# Patient Record
Sex: FEMALE
[De-identification: names, ages, dates, MRNs, and addresses within clinical notes are randomized per-mention and may not be internally consistent; named-entity substitution may affect disease eponyms.]

---

## 2017-01-11 ENCOUNTER — HOSPITAL ENCOUNTER (EMERGENCY)
Dept: HOSPITAL 5 - ED | Age: 57
Discharge: HOME | End: 2017-01-11
Payer: SELF-PAY

## 2017-01-11 VITALS — DIASTOLIC BLOOD PRESSURE: 82 MMHG | SYSTOLIC BLOOD PRESSURE: 116 MMHG
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
## 2017-01-11 DIAGNOSIS — S29.011A: Primary | ICD-10-CM

## 2017-01-11 DIAGNOSIS — E05.90: ICD-10-CM

## 2017-01-11 DIAGNOSIS — Z88.5: ICD-10-CM
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
## 2017-01-11 DIAGNOSIS — E11.9: ICD-10-CM

## 2017-01-11 DIAGNOSIS — I10: ICD-10-CM
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
## 2017-01-11 LAB
ANION GAP SERPL CALC-SCNC: 19 MMOL/L
BASOPHILS NFR BLD AUTO: 0.6 % (ref 0–1.8)
BUN SERPL-MCNC: 12 MG/DL (ref 7–17)
BUN/CREAT SERPL: 13.33 %
CALCIUM SERPL-MCNC: 9.1 MG/DL (ref 8.4–10.2)
CHLORIDE SERPL-SCNC: 94.9 MMOL/L (ref 98–107)
CK SERPL-CCNC: 26 UNITS/L (ref 30–135)
CO2 SERPL-SCNC: 27 MMOL/L (ref 22–30)
EOSINOPHIL NFR BLD AUTO: 1.8 % (ref 0–4.3)
GLUCOSE SERPL-MCNC: 269 MG/DL (ref 65–100)
HCT VFR BLD CALC: 43.5 % (ref 30.3–42.9)
HGB BLD-MCNC: 14.8 GM/DL (ref 10.1–14.3)
MCH RBC QN AUTO: 29 PG (ref 28–32)
MCHC RBC AUTO-ENTMCNC: 34 % (ref 30–34)
MCV RBC AUTO: 84 FL (ref 79–97)
PLATELET # BLD: 265 K/MM3 (ref 140–440)
POTASSIUM SERPL-SCNC: 4 MMOL/L (ref 3.6–5)
RBC # BLD AUTO: 5.17 M/MM3 (ref 3.65–5.03)
SODIUM SERPL-SCNC: 137 MMOL/L (ref 137–145)
WBC # BLD AUTO: 10.8 K/MM3 (ref 4.5–11)

## 2017-01-11 PROCEDURE — 93005 ELECTROCARDIOGRAM TRACING: CPT

## 2017-01-11 PROCEDURE — 71020: CPT

## 2017-01-11 PROCEDURE — 82553 CREATINE MB FRACTION: CPT

## 2017-01-11 PROCEDURE — 82550 ASSAY OF CK (CPK): CPT

## 2017-01-11 PROCEDURE — 85025 COMPLETE CBC W/AUTO DIFF WBC: CPT

## 2017-01-11 PROCEDURE — 36415 COLL VENOUS BLD VENIPUNCTURE: CPT

## 2017-01-11 PROCEDURE — 80048 BASIC METABOLIC PNL TOTAL CA: CPT

## 2017-01-11 PROCEDURE — 84484 ASSAY OF TROPONIN QUANT: CPT

## 2017-01-11 PROCEDURE — 71110 X-RAY EXAM RIBS BIL 3 VIEWS: CPT

## 2017-01-11 PROCEDURE — 93010 ELECTROCARDIOGRAM REPORT: CPT

## 2017-01-11 NOTE — XRAY REPORT
RIB RADIOGRAPHS WITH CHEST VIEW:



INDICATION: Patient fell on chest 5 days ago.  Chest, right upper pain. 

 Pain on breathing and raising arms.  Smoker, history of asthma.



COMPARISON: 07/09/2016 CXR.



FINDINGS: Frontal chest as also AP and oblique radiographs to evaluate 

bilateral ribs, 6 projections demonstrate normal cardiomediastinal 

silhouette. Clear lungs without effusions, CHF or pneumothorax.  Slight 

aortic knob calcifications.  Lumbar spondylosis and fusion hardware 

incompletely imaged.



Specifically, no definite or significantly displaced rib fractures 

identified.



CONCLUSION: Normal bilateral rib radiographs with few other incidental 

findings, as described. Please note that some acute rib fractures may 

be radiographically occult.



Thank you for the opportunity to participate in this patient's care.

## 2017-01-11 NOTE — XRAY REPORT
CHEST 2 VIEWS:



INDICATION: Chest pain, shortness of breath.



COMPARISON: 07/09/2016



FINDINGS: PA and lateral chest radiographs again demonstrate normal 

cardiomediastinal silhouette, slight aortic knob calcifications and 

clear lungs.  Grossly unremarkable bones. 



CONCLUSION: No acute disease in the chest.



Thank you for the opportunity to participate in this patient's care.

## 2017-01-11 NOTE — EMERGENCY DEPARTMENT REPORT
Addendum entered and electronically signed by RASTA ROQUE NP  01/11/ 17 20:54: 











Original Note:








ED Fall HPI





- General


Chief Complaint: Chest Pain


Stated Complaint: CHEST PAIN


Source: patient


Mode of arrival: Ambulatory





- History of Present Illness


Initial Comments: 





Patient presents after a fall 4 days ago landing on her chest.  She states the 

pain is in the right upper portion.  She states she tripped over a garbage can 

and fell on the concrete.  She does admit to trying Aleve with no help and 

hydrocodone 5 mg that did help.  She denies hitting her head, nausea, vomiting, 

chills, fever.


MD Complaint: fall


-: Sudden


Fall From: standing


When Fall Occurred: # days PTA (4)


Fall Witnessed: no


Place Fall Occurred: home


Loss of Consciousness: none


Prolonged Down Time?: no


Symptoms Prior to Fall: none


Location: chest


Severity: severe


Severity scale (0 -10): 10


Quality: sharp, aching


Context: tripped/slipped


Associated Symptoms: denies





- Related Data


 Previous Rx's











 Medication  Instructions  Recorded  Last Taken  Type


 


Miconazole Nitrate [Miconazole 7 1 suppositor VG QHS #7 supp.vag 10/26/15 

Unknown Rx





Vag Suppos]    


 


Ciprofloxacin HCl [Ciprofloxacin 500 mg PO BID #20 tablet 07/10/16 Unknown Rx





TAB]    


 


Cyclobenzaprine HCl 7.5 mg PO BID PRN #14 tab 01/11/17 Unknown Rx





[Cyclobenzaprine 7.5 MG TAB]    











 Allergies











Allergy/AdvReac Type Severity Reaction Status Date / Time


 


codeine Allergy  Rash Verified 10/25/15 12:04














ED Review of Systems


ROS: 


Stated complaint: CHEST PAIN


Other details as noted in HPI





Constitutional: denies: chills, fever


Respiratory: denies: cough, shortness of breath, wheezing


Cardiovascular: denies: chest pain, palpitations


Gastrointestinal: denies: abdominal pain, nausea, diarrhea


Musculoskeletal: as per HPI


Skin: denies: rash, lesions


Neurological: denies: headache, weakness, paresthesias





ED Past Medical Hx





- Past Medical History


Hx Hypertension: Yes


Hx Diabetes: Yes


Additional medical history: hyperthyroid





- Surgical History


Additional Surgical History: fusion to back





- Social History


Smoking Status: Never Smoker


Substance Use Type: None





- Medications


Home Medications: 


 Home Medications











 Medication  Instructions  Recorded  Confirmed  Last Taken  Type


 


Miconazole Nitrate [Miconazole 7 1 suppositor VG QHS #7 supp.vag 10/26/15  

Unknown Rx





Vag Suppos]     


 


Ciprofloxacin HCl [Ciprofloxacin 500 mg PO BID #20 tablet 07/10/16  Unknown Rx





TAB]     


 


Cyclobenzaprine HCl 7.5 mg PO BID PRN #14 tab 01/11/17  Unknown Rx





[Cyclobenzaprine 7.5 MG TAB]     














ED Physical Exam





- General


Limitations: No Limitations


General appearance: alert, in no apparent distress





- Head


Head exam: Present: atraumatic, normocephalic





- Neck


Neck exam: Present: normal inspection, full ROM





- Respiratory


Respiratory exam: Present: normal lung sounds bilaterally.  Absent: respiratory 

distress





- Cardiovascular


Cardiovascular Exam: Present: regular rate, normal rhythm.  Absent: systolic 

murmur, diastolic murmur, rubs, gallop





- GI/Abdominal


GI/Abdominal exam: Present: soft, normal bowel sounds





- Back Exam


Back exam: Present: normal inspection





- Neurological Exam


Neurological exam: Present: alert, oriented X3





- Psychiatric


Psychiatric exam: Present: normal affect, normal mood





- Skin


Skin exam: Present: warm, dry, intact, normal color.  Absent: rash





- Other


Other exam information: 





There is no bruising to her right upper chest, there is no decreased range of 

motion of upper extremities.  She is tender to palpation in the intercostal 

spaces approximately at rib #4-5.  Not appreciate any swelling, or redness.





ED Course


 Vital Signs











  01/11/17





  15:51


 


Temperature 98.6 F


 


Pulse Rate 95 H


 


Respiratory 20





Rate 


 


Blood Pressure 112/79


 


O2 Sat by Pulse 100





Oximetry 














ED Medical Decision Making





- Lab Data


Result diagrams: 


 01/11/17 16:51





 01/11/17 16:51





- Medical Decision Making





Patient presents with a fall, her EKG is within normal her chest x-ray is 

within normal and her rib x-ray is within normal.  She is requesting hydrocodone

, I informed her I could give her tramadol which she declined.  I will give her 

Flexeril.  I'll advise her to use a pillow to splint when taking deep breaths.





- Differential Diagnosis


rib fracture, muscle strain


Critical Care Time: No


Critical care attestation.: 


If time is entered above; I have spent that time in minutes in the direct care 

of this critically ill patient, excluding procedure time.








ED Disposition


Clinical Impression: 


 Pectoralis muscle strain


Disposition: DISCHARGED TO HOME OR SELFCARE


Is pt being admited?: No


Does the pt Need Aspirin: No


Condition: Stable


Instructions:  Muscle Strain (ED), Musculoskeletal Pain (ED)


Prescriptions: 


Cyclobenzaprine HCl [Cyclobenzaprine 7.5 MG TAB] 7.5 mg PO BID PRN #14 tab


 PRN Reason: Pain


Time of Disposition: 20:52

## 2017-01-11 NOTE — EMERGENCY DEPARTMENT REPORT
Stated Complaint: CHEST PAIN





- HPI


History of Present Illness: 


Patient c/o soreness-type chest pain gradually worsening x days.


States slipped and fell, landing on her chest 5 days ago.


States pain causing her to have SOB.








- Exam


Physical Exam: 





Heart: RRR. R Chest wall tender to palpation.





MSE screening note: 


Focused history and physical exam performed.


Due to findings the following was ordered:











ED Disposition for MSE


Condition: Stable

## 2017-08-10 ENCOUNTER — HOSPITAL ENCOUNTER (EMERGENCY)
Dept: HOSPITAL 5 - ED | Age: 57
Discharge: LEFT BEFORE DISCHARGE | End: 2017-08-10
Payer: SELF-PAY

## 2017-08-10 VITALS — SYSTOLIC BLOOD PRESSURE: 155 MMHG | DIASTOLIC BLOOD PRESSURE: 91 MMHG

## 2017-08-10 DIAGNOSIS — M79.89: Primary | ICD-10-CM

## 2017-08-10 DIAGNOSIS — Z53.21: ICD-10-CM

## 2017-08-11 ENCOUNTER — HOSPITAL ENCOUNTER (INPATIENT)
Dept: HOSPITAL 5 - ED | Age: 57
LOS: 5 days | Discharge: HOME | DRG: 871 | End: 2017-08-16
Attending: INTERNAL MEDICINE | Admitting: INTERNAL MEDICINE
Payer: SELF-PAY

## 2017-08-11 DIAGNOSIS — Z79.899: ICD-10-CM

## 2017-08-11 DIAGNOSIS — Z88.5: ICD-10-CM

## 2017-08-11 DIAGNOSIS — I10: ICD-10-CM

## 2017-08-11 DIAGNOSIS — L02.411: ICD-10-CM

## 2017-08-11 DIAGNOSIS — M19.90: ICD-10-CM

## 2017-08-11 DIAGNOSIS — L02.92: ICD-10-CM

## 2017-08-11 DIAGNOSIS — B02.9: ICD-10-CM

## 2017-08-11 DIAGNOSIS — T50.2X5A: ICD-10-CM

## 2017-08-11 DIAGNOSIS — A41.9: Primary | ICD-10-CM

## 2017-08-11 DIAGNOSIS — L03.111: ICD-10-CM

## 2017-08-11 DIAGNOSIS — E87.1: ICD-10-CM

## 2017-08-11 DIAGNOSIS — J45.909: ICD-10-CM

## 2017-08-11 DIAGNOSIS — N17.0: ICD-10-CM

## 2017-08-11 DIAGNOSIS — R65.21: ICD-10-CM

## 2017-08-11 DIAGNOSIS — E13.10: ICD-10-CM

## 2017-08-11 DIAGNOSIS — E86.9: ICD-10-CM

## 2017-08-11 DIAGNOSIS — N39.0: ICD-10-CM

## 2017-08-11 DIAGNOSIS — E78.5: ICD-10-CM
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
## 2017-08-11 DIAGNOSIS — E03.9: ICD-10-CM

## 2017-08-11 DIAGNOSIS — F17.210: ICD-10-CM

## 2017-08-11 LAB
ALBUMIN SERPL-MCNC: 2.7 G/DL (ref 3.9–5)
ALBUMIN/GLOB SERPL: 0.6 %
ALP SERPL-CCNC: 105 UNITS/L (ref 35–129)
ALT SERPL-CCNC: 13 UNITS/L (ref 7–56)
ANION GAP SERPL CALC-SCNC: 18 MMOL/L
ANION GAP SERPL CALC-SCNC: 22 MMOL/L
ANION GAP SERPL CALC-SCNC: 23 MMOL/L
BACTERIA #/AREA URNS HPF: (no result) /HPF
BILIRUB SERPL-MCNC: 0.6 MG/DL (ref 0.1–1.2)
BILIRUB UR QL STRIP: (no result)
BLASTOCYTES % (MANUAL): 0 %
BLOOD UR QL VISUAL: (no result)
BUN SERPL-MCNC: 31 MG/DL (ref 7–17)
BUN SERPL-MCNC: 32 MG/DL (ref 7–17)
BUN SERPL-MCNC: 34 MG/DL (ref 7–17)
BUN/CREAT SERPL: 24.61 %
BUN/CREAT SERPL: 25.83 %
BUN/CREAT SERPL: 26.15 %
CALCIUM SERPL-MCNC: 7.9 MG/DL (ref 8.4–10.2)
CALCIUM SERPL-MCNC: 8 MG/DL (ref 8.4–10.2)
CALCIUM SERPL-MCNC: 8.5 MG/DL (ref 8.4–10.2)
CHLORIDE SERPL-SCNC: 79.1 MMOL/L (ref 98–107)
CHLORIDE SERPL-SCNC: 87.1 MMOL/L (ref 98–107)
CHLORIDE SERPL-SCNC: 94.1 MMOL/L (ref 98–107)
CO2 SERPL-SCNC: 21 MMOL/L (ref 22–30)
CO2 SERPL-SCNC: 22 MMOL/L (ref 22–30)
CO2 SERPL-SCNC: 22 MMOL/L (ref 22–30)
GLUCOSE SERPL-MCNC: 223 MG/DL (ref 65–100)
GLUCOSE SERPL-MCNC: 410 MG/DL (ref 65–100)
GLUCOSE SERPL-MCNC: 659 MG/DL (ref 65–100)
HCT VFR BLD CALC: 37.3 % (ref 30.3–42.9)
HGB BLD-MCNC: 12.2 GM/DL (ref 10.1–14.3)
KETONES UR STRIP-MCNC: 20 MG/DL
LEUKOCYTE ESTERASE UR QL STRIP: (no result)
MAGNESIUM SERPL-MCNC: 2 MG/DL (ref 1.7–2.3)
MAGNESIUM SERPL-MCNC: 2.1 MG/DL (ref 1.7–2.3)
MCH RBC QN AUTO: 28 PG (ref 28–32)
MCHC RBC AUTO-ENTMCNC: 33 % (ref 30–34)
MCV RBC AUTO: 87 FL (ref 79–97)
MUCOUS THREADS #/AREA URNS HPF: (no result) /HPF
NITRITE UR QL STRIP: (no result)
PH UR STRIP: 5 [PH] (ref 5–7)
PHOSPHATE SERPL-MCNC: 2.5 MG/DL (ref 2.5–4.5)
PHOSPHATE SERPL-MCNC: 3.7 MG/DL (ref 2.5–4.5)
PLATELET # BLD: 256 K/MM3 (ref 140–440)
POTASSIUM SERPL-SCNC: 4.5 MMOL/L (ref 3.6–5)
POTASSIUM SERPL-SCNC: 4.6 MMOL/L (ref 3.6–5)
POTASSIUM SERPL-SCNC: 4.7 MMOL/L (ref 3.6–5)
PROT SERPL-MCNC: 6.9 G/DL (ref 6.3–8.2)
PROT UR STRIP-MCNC: (no result) MG/DL
RBC # BLD AUTO: 4.3 M/MM3 (ref 3.65–5.03)
RBC #/AREA URNS HPF: 7 /HPF (ref 0–6)
SODIUM SERPL-SCNC: 118 MMOL/L (ref 137–145)
SODIUM SERPL-SCNC: 126 MMOL/L (ref 137–145)
SODIUM SERPL-SCNC: 130 MMOL/L (ref 137–145)
TOTAL CELLS COUNTED PERCENT: 0
UROBILINOGEN UR-MCNC: 2 MG/DL (ref ?–2)
WBC # BLD AUTO: 19.8 K/MM3 (ref 4.5–11)
WBC #/AREA URNS HPF: > 182 /HPF (ref 0–6)

## 2017-08-11 PROCEDURE — 82805 BLOOD GASES W/O2 SATURATION: CPT

## 2017-08-11 PROCEDURE — 86140 C-REACTIVE PROTEIN: CPT

## 2017-08-11 PROCEDURE — 36415 COLL VENOUS BLD VENIPUNCTURE: CPT

## 2017-08-11 PROCEDURE — 82043 UR ALBUMIN QUANTITATIVE: CPT

## 2017-08-11 PROCEDURE — 96375 TX/PRO/DX INJ NEW DRUG ADDON: CPT

## 2017-08-11 PROCEDURE — 83935 ASSAY OF URINE OSMOLALITY: CPT

## 2017-08-11 PROCEDURE — 96361 HYDRATE IV INFUSION ADD-ON: CPT

## 2017-08-11 PROCEDURE — 87186 SC STD MICRODIL/AGAR DIL: CPT

## 2017-08-11 PROCEDURE — 85007 BL SMEAR W/DIFF WBC COUNT: CPT

## 2017-08-11 PROCEDURE — 80048 BASIC METABOLIC PNL TOTAL CA: CPT

## 2017-08-11 PROCEDURE — 87040 BLOOD CULTURE FOR BACTERIA: CPT

## 2017-08-11 PROCEDURE — 87806 HIV AG W/HIV1&2 ANTB W/OPTIC: CPT

## 2017-08-11 PROCEDURE — 84156 ASSAY OF PROTEIN URINE: CPT

## 2017-08-11 PROCEDURE — 99406 BEHAV CHNG SMOKING 3-10 MIN: CPT

## 2017-08-11 PROCEDURE — 90732 PPSV23 VACC 2 YRS+ SUBQ/IM: CPT

## 2017-08-11 PROCEDURE — 82962 GLUCOSE BLOOD TEST: CPT

## 2017-08-11 PROCEDURE — 84100 ASSAY OF PHOSPHORUS: CPT

## 2017-08-11 PROCEDURE — 71020: CPT

## 2017-08-11 PROCEDURE — 87086 URINE CULTURE/COLONY COUNT: CPT

## 2017-08-11 PROCEDURE — 82140 ASSAY OF AMMONIA: CPT

## 2017-08-11 PROCEDURE — 84300 ASSAY OF URINE SODIUM: CPT

## 2017-08-11 PROCEDURE — 87076 CULTURE ANAEROBE IDENT EACH: CPT

## 2017-08-11 PROCEDURE — P9045 ALBUMIN (HUMAN), 5%, 250 ML: HCPCS

## 2017-08-11 PROCEDURE — 80053 COMPREHEN METABOLIC PANEL: CPT

## 2017-08-11 PROCEDURE — 82010 KETONE BODYS QUAN: CPT

## 2017-08-11 PROCEDURE — 85025 COMPLETE CBC W/AUTO DIFF WBC: CPT

## 2017-08-11 PROCEDURE — 83735 ASSAY OF MAGNESIUM: CPT

## 2017-08-11 PROCEDURE — 81001 URINALYSIS AUTO W/SCOPE: CPT

## 2017-08-11 PROCEDURE — 96365 THER/PROPH/DIAG IV INF INIT: CPT

## 2017-08-11 RX ADMIN — LISINOPRIL SCH: 20 TABLET ORAL at 16:29

## 2017-08-11 RX ADMIN — SODIUM CHLORIDE SCH MLS/HR: 0.9 INJECTION, SOLUTION INTRAVENOUS at 22:55

## 2017-08-11 RX ADMIN — ESCITALOPRAM OXALATE SCH: 10 TABLET ORAL at 16:42

## 2017-08-11 RX ADMIN — SODIUM CHLORIDE SCH MLS/HR: 0.9 INJECTION, SOLUTION INTRAVENOUS at 17:56

## 2017-08-11 RX ADMIN — HYDROMORPHONE HYDROCHLORIDE PRN MG: 1 INJECTION, SOLUTION INTRAMUSCULAR; INTRAVENOUS; SUBCUTANEOUS at 21:37

## 2017-08-11 NOTE — EMERGENCY DEPARTMENT REPORT
ED General Adult HPI





- General


Chief complaint: Skin/Abscess/Foreign Body


Stated complaint: KNOT UNDER RIGHT ARM/INFLAMED


Time Seen by Provider: 08/11/17 12:05


Source: patient, RN notes reviewed


Mode of arrival: Ambulatory


Limitations: No Limitations





- History of Present Illness


Initial comments: 


This is a 57-year-old female.  She is previously unknown to me.  She presents 

to the ER complaining of right lateral flank wall redness, pain and discomfort.

  Complains of generalized weakness.  No fevers or chills, no chest pain or 

shortness of breath.  In the emergency department, patient is found to be 

hyperglycemic with a blood sugar of 659, with a decreased sodium of 118, likely 

combination of pseudohyponatremia, and hypovolemic hyponatremia.  Patient was 

found to be tachycardic with a leukocytosis.  The patient is going to be 

treated empirically for soft tissue cellulitis/sepsis, with appropriate normal 

saline bolus, lactic acid, blood cultures, and IV clindamycin.





The patient is started on the diabetic ketoacidosis pathway, with an insulin 

drip and insulin push.





Case was presented to the critical care physician on-call, Dr. Clemons, who 

agreed with placement to the intensive care unit for the aforementioned 

findings.





Case was also discussed with nephrology on-call, Dr. Merida, who agree the 

patient's hyponatremia was most likely combination secondary to 

pseudohyponatremia and hypovolemia.











Case is presented to the Hospital physician, Dr. Friend, who accepts the patient 

to his service.














The patient's right lateral flank wall erythema and induration are not suitable 

for incision and drainage at this time, as there is no fluctuance.


























-: Gradual


Location: chest, back, abdomen


Quality: aching


Consistency: constant


Improves with: rest


Worsens with: movement


Associated Symptoms: loss of appetite, malaise, weakness





- Related Data


 Home Medications











 Medication  Instructions  Recorded  Confirmed  Last Taken


 


Escitalopram [Lexapro] 10 mg PO DAILY 08/11/17 08/11/17 08/11/17


 


Levothyroxine [Synthroid] 50 mcg PO QAM 08/11/17 08/11/17 08/11/17


 


Lisinopril [Zestril] 20 mg PO QDAY 08/11/17 08/11/17 08/11/17


 


PARoxetine CR (NF) [Paxil (Nf)] 12.5 mg PO QAM 08/11/17 08/11/17 08/11/17


 


Rosuvastatin (Nf) [Crestor] 20 mg PO QHS 08/11/17 08/11/17 08/11/17











 Allergies











Allergy/AdvReac Type Severity Reaction Status Date / Time


 


codeine Allergy  Rash Verified 08/11/17 10:49














ED Review of Systems


ROS: 


Stated complaint: KNOT UNDER RIGHT ARM/INFLAMED


Other details as noted in HPI





Constitutional: malaise


Eyes: denies: vision change


ENT: denies: epistaxis


Respiratory: denies: cough


Cardiovascular: denies: chest pain


Gastrointestinal: denies: abdominal pain


Musculoskeletal: back pain, arthralgia, myalgia


Skin: rash, lesions


Neurological: weakness





ED Past Medical Hx





- Past Medical History


Hx Hypertension: Yes


Hx Diabetes: Yes


Hx Arthritis: Yes


Hx Asthma: Yes


Additional medical history: hyperthyroid





- Surgical History


Additional Surgical History: fusion to back





- Social History


Smoking Status: Current Every Day Smoker


Substance Use Type: None





- Medications


Home Medications: 


 Home Medications











 Medication  Instructions  Recorded  Confirmed  Last Taken  Type


 


Escitalopram [Lexapro] 10 mg PO DAILY 08/11/17 08/11/17 08/11/17 History


 


Levothyroxine [Synthroid] 50 mcg PO QAM 08/11/17 08/11/17 08/11/17 History


 


Lisinopril [Zestril] 20 mg PO QDAY 08/11/17 08/11/17 08/11/17 History


 


PARoxetine CR (NF) [Paxil (Nf)] 12.5 mg PO QAM 08/11/17 08/11/17 08/11/17 

History


 


Rosuvastatin (Nf) [Crestor] 20 mg PO QHS 08/11/17 08/11/17 08/11/17 History














ED Physical Exam





- General


Limitations: No Limitations


General appearance: alert, in no apparent distress





- Head


Head exam: Present: atraumatic, normocephalic





- Eye


Eye exam: Present: normal appearance, EOMI.  Absent: nystagmus





- ENT


ENT exam: Present: normal exam, normal orophraynx, mucous membranes dry, normal 

external ear exam





- Neck


Neck exam: Present: normal inspection, full ROM.  Absent: tenderness, 

meningismus





- Respiratory


Respiratory exam: Present: normal lung sounds bilaterally, chest wall 

tenderness (on the right lateral thoracic wall, there are areas of induration, 

redness and tenderness.  Well-circumscribed area of induration in the proximal 

axilla, however no fluctuance or crepitus.).  Absent: respiratory distress, 

wheezes, rales, rhonchi, stridor, accessory muscle use, decreased breath sounds

, prolonged expiratory





- Cardiovascular


Cardiovascular Exam: Present: normal rhythm, tachycardia, normal heart sounds.  

Absent: systolic murmur, diastolic murmur, rubs, gallop





- GI/Abdominal


GI/Abdominal exam: Present: soft, normal bowel sounds.  Absent: distended, 

tenderness, guarding, rebound, rigid





- Extremities Exam


Extremities exam: Present: normal inspection, full ROM, normal capillary 

refill.  Absent: pedal edema, joint swelling, calf tenderness





- Back Exam


Back exam: Present: normal inspection, full ROM.  Absent: tenderness, CVA 

tenderness (R), CVA tenderness (L), muscle spasm, paraspinal tenderness, 

vertebral tenderness





- Neurological Exam


Neurological exam: Present: alert, oriented X3, normal gait, other (Extraocular 

movements intact.  Tongue midline.  No facial droop.  Facial sensation intact 

to light touch in the V1, V2, V3 distribution bilaterally.  5 and 5 strength in 

4 extremities..  Sensation is intact to light touch in 4 extremities.).  Absent

: motor sensory deficit





- Psychiatric


Psychiatric exam: Present: normal affect, normal mood





- Skin


Skin exam: Present: warm, rash, erythema





ED Course


 Vital Signs











  08/11/17 08/11/17 08/11/17





  10:38 13:56 14:29


 


Temperature 97.9 F 98.5 F 


 


Pulse Rate 125 H 103 H 107 H


 


Respiratory 18 18 17





Rate   


 


Blood Pressure 107/55  


 


Blood Pressure   





[94/56]   


 


Blood Pressure  104/61 





[Left]   


 


O2 Sat by Pulse 98 100 





Oximetry   














  08/11/17 08/11/17 08/11/17





  14:30 14:40 14:50


 


Temperature   


 


Pulse Rate 109 H 109 H 104 H


 


Respiratory 13 15 11 L





Rate   


 


Blood Pressure  120/61 120/61


 


Blood Pressure   





[94/56]   


 


Blood Pressure   





[Left]   


 


O2 Sat by Pulse   98





Oximetry   














  08/11/17 08/11/17 08/11/17





  15:00 15:10 15:20


 


Temperature   


 


Pulse Rate 103 H 101 H 103 H


 


Respiratory 18 23 19





Rate   


 


Blood Pressure 97/55 97/55 97/55


 


Blood Pressure   





[94/56]   


 


Blood Pressure   





[Left]   


 


O2 Sat by Pulse 97 98 98





Oximetry   














  08/11/17 08/11/17 08/11/17





  15:30 15:40 15:50


 


Temperature   


 


Pulse Rate 100 H 105 H 103 H


 


Respiratory 26 H 16 14





Rate   


 


Blood Pressure 97/55 97/55 97/55


 


Blood Pressure   





[94/56]   


 


Blood Pressure   





[Left]   


 


O2 Sat by Pulse 98 97 98





Oximetry   














  08/11/17 08/11/17 08/11/17





  16:00 16:10 16:19


 


Temperature   


 


Pulse Rate 105 H 108 H 


 


Respiratory 10 L 13 22





Rate   


 


Blood Pressure 94/46 94/56 


 


Blood Pressure   





[94/56]   


 


Blood Pressure   





[Left]   


 


O2 Sat by Pulse 98 98 





Oximetry   














  08/11/17 08/11/17 08/11/17





  16:20 16:21 16:30


 


Temperature  98.6 F 


 


Pulse Rate 102 H 105 H 102 H


 


Respiratory 29 H 22 31 H





Rate   


 


Blood Pressure 94/56  94/56


 


Blood Pressure  94/56 





[94/56]   


 


Blood Pressure   





[Left]   


 


O2 Sat by Pulse 97 98 98





Oximetry   














  08/11/17 08/11/17 08/11/17





  16:40 16:50 17:00


 


Temperature   


 


Pulse Rate 102 H 103 H 103 H


 


Respiratory 29 H 35 H 27 H





Rate   


 


Blood Pressure 94/56 94/56 80/43


 


Blood Pressure   





[94/56]   


 


Blood Pressure   





[Left]   


 


O2 Sat by Pulse 97 95 96





Oximetry   














  08/11/17 08/11/17 08/11/17





  17:10 17:20 17:30


 


Temperature   


 


Pulse Rate 104 H 105 H 98 H


 


Respiratory 31 H 14 30 H





Rate   


 


Blood Pressure 80/43 80/43 80/42


 


Blood Pressure   





[94/56]   


 


Blood Pressure   





[Left]   


 


O2 Sat by Pulse 94 96 95





Oximetry   














  08/11/17 08/11/17 08/11/17





  17:40 17:50 18:00


 


Temperature   


 


Pulse Rate 98 H 98 H 99 H


 


Respiratory 28 H 27 H 24





Rate   


 


Blood Pressure 85/46 84/45 85/47


 


Blood Pressure   





[94/56]   


 


Blood Pressure   





[Left]   


 


O2 Sat by Pulse 96 95 96





Oximetry   














ED Medical Decision Making





- Lab Data


Result diagrams: 


 08/11/17 13:00





 08/11/17 16:18








 Vital Signs











  08/11/17 08/11/17





  10:38 13:56


 


Temperature 97.9 F 98.5 F


 


Pulse Rate 125 H 103 H


 


Respiratory 18 18





Rate  


 


Blood Pressure 107/55 


 


Blood Pressure  104/61





[Left]  


 


O2 Sat by Pulse 98 100





Oximetry  














 Lab Results











  08/11/17 08/11/17 08/11/17 Range/Units





  13:00 13:00 13:00 


 


WBC  19.8 H    (4.5-11.0)  K/mm3


 


RBC  4.30    (3.65-5.03)  M/mm3


 


Hgb  12.2    (10.1-14.3)  gm/dl


 


Hct  37.3    (30.3-42.9)  %


 


MCV  87    (79-97)  fl


 


MCH  28    (28-32)  pg


 


MCHC  33    (30-34)  %


 


RDW  13.7    (13.2-15.2)  %


 


Plt Count  256    (140-440)  K/mm3


 


Add Manual Diff  Complete    


 


Total Counted  100    


 


Seg Neutrophils %  Np    


 


Seg Neuts % (Manual)  84.0 H    (40.0-70.0)  %


 


Band Neutrophils %  14.0    %


 


Lymphocytes % (Manual)  2.0 L    (13.4-35.0)  %


 


Reactive Lymphs % (Man)  0    %


 


Monocytes % (Manual)  0    (0.0-7.3)  %


 


Eosinophils % (Manual)  0    (0.0-4.3)  %


 


Basophils % (Manual)  0    (0.0-1.8)  %


 


Metamyelocytes %  0    %


 


Myelocytes %  0    %


 


Promyelocytes %  0    %


 


Blast Cells %  0    %


 


Nucleated RBC %  Not Reportable    


 


Seg Neutrophils # Man  16.6 H    (1.8-7.7)  K/mm3


 


Band Neutrophils #  2.8    K/mm3


 


Lymphocytes # (Manual)  0.4 L    (1.2-5.4)  K/mm3


 


Abs React Lymphs (Man)  0.0    K/mm3


 


Monocytes # (Manual)  0.0    (0.0-0.8)  K/mm3


 


Eosinophils # (Manual)  0.0    (0.0-0.4)  K/mm3


 


Basophils # (Manual)  0.0    (0.0-0.1)  K/mm3


 


Metamyelocytes #  0.0    K/mm3


 


Myelocytes #  0.0    K/mm3


 


Promyelocytes #  0.0    K/mm3


 


Blast Cells #  0.0    K/mm3


 


WBC Morphology  Not Reportable    


 


Hypersegmented Neuts  Not Reportable    


 


Hyposegmented Neuts  Not Reportable    


 


Hypogranular Neuts  Not Reportable    


 


Smudge Cells  Not Reportable    


 


Toxic Granulation  Not Reportable    


 


Toxic Vacuolation  Not Reportable    


 


Dohle Bodies  Not Reportable    


 


Pelger-Huet Anomaly  Not Reportable    


 


Luis Rods  Not Reportable    


 


Platelet Estimate  Appears normal    


 


Clumped Platelets  Not Reportable    


 


Plt Clumps, EDTA  Not Reportable    


 


Large Platelets  Few    


 


Giant Platelets  Not Reportable    


 


Platelet Satelliting  Not Reportable    


 


Plt Morphology Comment  Not Reportable    


 


RBC Morphology  Normal    


 


Dimorphic RBCs  Not Reportable    


 


Polychromasia  Not Reportable    


 


Hypochromasia  Not Reportable    


 


Poikilocytosis  Not Reportable    


 


Anisocytosis  Not Reportable    


 


Microcytosis  Not Reportable    


 


Macrocytosis  Not Reportable    


 


Spherocytes  Not Reportable    


 


Pappenheimer Bodies  Not Reportable    


 


Sickle Cells  Not Reportable    


 


Target Cells  Not Reportable    


 


Tear Drop Cells  Not Reportable    


 


Ovalocytes  Not Reportable    


 


Helmet Cells  Not Reportable    


 


Bennett-Cash Bodies  Not Reportable    


 


Cabot Rings  Not Reportable    


 


Hasmukh Cells  Not Reportable    


 


Bite Cells  Not Reportable    


 


Crenated Cell  Not Reportable    


 


Elliptocytes  Not Reportable    


 


Acanthocytes (Spur)  Not Reportable    


 


Rouleaux  Not Reportable    


 


Hemoglobin C Crystals  Not Reportable    


 


Schistocytes  Not Reportable    


 


Malaria parasites  Not Reportable    


 


Thor Bodies  Not Reportable    


 


Hem Pathologist Commnt  No    


 


VBG pH     (7.320-7.420)  


 


Sodium   118 L*   (137-145)  mmol/L


 


Potassium   4.6   (3.6-5.0)  mmol/L


 


Chloride   79.1 L   ()  mmol/L


 


Carbon Dioxide   22   (22-30)  mmol/L


 


Anion Gap   22   mmol/L


 


BUN   32 H   (7-17)  mg/dL


 


Creatinine   1.3 H   (0.7-1.2)  mg/dL


 


Estimated GFR   43   ml/min


 


BUN/Creatinine Ratio   24.61   %


 


Glucose   659 H*   ()  mg/dL


 


Lactic Acid    2.90 H*  (0.7-2.0)  mmol/L


 


Calcium   8.5   (8.4-10.2)  mg/dL


 


Total Bilirubin   0.60   (0.1-1.2)  mg/dL


 


AST   14   (5-40)  units/L


 


ALT   13   (7-56)  units/L


 


Alkaline Phosphatase   105   ()  units/L


 


Total Protein   6.9   (6.3-8.2)  g/dL


 


Albumin   2.7 L   (3.9-5)  g/dL


 


Albumin/Globulin Ratio   0.6   %














  08/11/17 Range/Units





  13:54 


 


WBC   (4.5-11.0)  K/mm3


 


RBC   (3.65-5.03)  M/mm3


 


Hgb   (10.1-14.3)  gm/dl


 


Hct   (30.3-42.9)  %


 


MCV   (79-97)  fl


 


MCH   (28-32)  pg


 


MCHC   (30-34)  %


 


RDW   (13.2-15.2)  %


 


Plt Count   (140-440)  K/mm3


 


Add Manual Diff   


 


Total Counted   


 


Seg Neutrophils %   


 


Seg Neuts % (Manual)   (40.0-70.0)  %


 


Band Neutrophils %   %


 


Lymphocytes % (Manual)   (13.4-35.0)  %


 


Reactive Lymphs % (Man)   %


 


Monocytes % (Manual)   (0.0-7.3)  %


 


Eosinophils % (Manual)   (0.0-4.3)  %


 


Basophils % (Manual)   (0.0-1.8)  %


 


Metamyelocytes %   %


 


Myelocytes %   %


 


Promyelocytes %   %


 


Blast Cells %   %


 


Nucleated RBC %   


 


Seg Neutrophils # Man   (1.8-7.7)  K/mm3


 


Band Neutrophils #   K/mm3


 


Lymphocytes # (Manual)   (1.2-5.4)  K/mm3


 


Abs React Lymphs (Man)   K/mm3


 


Monocytes # (Manual)   (0.0-0.8)  K/mm3


 


Eosinophils # (Manual)   (0.0-0.4)  K/mm3


 


Basophils # (Manual)   (0.0-0.1)  K/mm3


 


Metamyelocytes #   K/mm3


 


Myelocytes #   K/mm3


 


Promyelocytes #   K/mm3


 


Blast Cells #   K/mm3


 


WBC Morphology   


 


Hypersegmented Neuts   


 


Hyposegmented Neuts   


 


Hypogranular Neuts   


 


Smudge Cells   


 


Toxic Granulation   


 


Toxic Vacuolation   


 


Dohle Bodies   


 


Pelger-Huet Anomaly   


 


Luis Rods   


 


Platelet Estimate   


 


Clumped Platelets   


 


Plt Clumps, EDTA   


 


Large Platelets   


 


Giant Platelets   


 


Platelet Satelliting   


 


Plt Morphology Comment   


 


RBC Morphology   


 


Dimorphic RBCs   


 


Polychromasia   


 


Hypochromasia   


 


Poikilocytosis   


 


Anisocytosis   


 


Microcytosis   


 


Macrocytosis   


 


Spherocytes   


 


Pappenheimer Bodies   


 


Sickle Cells   


 


Target Cells   


 


Tear Drop Cells   


 


Ovalocytes   


 


Helmet Cells   


 


Bennett-Cash Bodies   


 


Cabot Rings   


 


Hasmukh Cells   


 


Bite Cells   


 


Crenated Cell   


 


Elliptocytes   


 


Acanthocytes (Spur)   


 


Rouleaux   


 


Hemoglobin C Crystals   


 


Schistocytes   


 


Malaria parasites   


 


Thor Bodies   


 


Hem Pathologist Commnt   


 


VBG pH  7.347  (7.320-7.420)  


 


Sodium   (137-145)  mmol/L


 


Potassium   (3.6-5.0)  mmol/L


 


Chloride   ()  mmol/L


 


Carbon Dioxide   (22-30)  mmol/L


 


Anion Gap   mmol/L


 


BUN   (7-17)  mg/dL


 


Creatinine   (0.7-1.2)  mg/dL


 


Estimated GFR   ml/min


 


BUN/Creatinine Ratio   %


 


Glucose   ()  mg/dL


 


Lactic Acid   (0.7-2.0)  mmol/L


 


Calcium   (8.4-10.2)  mg/dL


 


Total Bilirubin   (0.1-1.2)  mg/dL


 


AST   (5-40)  units/L


 


ALT   (7-56)  units/L


 


Alkaline Phosphatase   ()  units/L


 


Total Protein   (6.3-8.2)  g/dL


 


Albumin   (3.9-5)  g/dL


 


Albumin/Globulin Ratio   %

















- Radiology Data


Radiology results: image reviewed


interpreted by me: 


X-ray of the chest is negative for acute disease














Critical Care Time: Yes


Critical care time in (mins) excluding proc time.: 60


Critical care attestation.: 


If time is entered above; I have spent that time in minutes in the direct care 

of this critically ill patient, excluding procedure time.





Critical Care Time: 


Critical care time includes multiple bedside evaluations, interpretation of 

laboratory studies, radiology studies, time spent managing critically ill 

patient with multiple metabolic, electrolytes and infectious disease 

abnormalities.  This required consultation with hospital medicine, critical 

care nephrology.  

















This does not include procedure time.














ED Disposition


Clinical Impression: 


 Sepsis affecting skin, Hyperglycemia, Renal insufficiency





Disposition: DC-09 OP ADMIT IP TO THIS HOSP


Is pt being admited?: Yes


Condition: Good

## 2017-08-11 NOTE — XRAY REPORT
Chest 2 views: Compared to 1/11/17.



History: Cough.



Findings:



Normal cardiomediastinal silhouette. Trachea is midline. No 

consolidation, pneumothorax or pleural effusion.



Impression:



No acute cardiopulmonary findings.

## 2017-08-11 NOTE — ADMIT CRITERIA FORM
Admission Criteria Documentation: 








         SEPSIS and OTHER FEBRILE ILLNESS, W/O FOCAL INFECTION





Clinical Indications for Admission to Inpatient Care 


                        


                                                                              (

Place 'X' for any and all applicable criteria):





Admission to inpatient status for two midnights or more is indicated for ANY ONE

 of the following (1)(2)(3):


[ X] I.       Bacteremia 


[ ]II.       Suspected or identified specific infection requiring 

hospitalization (eg, meningitis, endocarditis) 


[ ]III.      Hemodynamic instability 


[ ]IV.     Temperature > 104.9 0F (40.5 0C) (oral)       


[ ]V.      Core (rectal) temperature < 95 0F (35 0C) (eg, thought to be due to 

infection)    


[ ]VI.     Altered mental status that is severe or persistent                   


[ ]VII.    Failure or unavailability of outpatient antimicrobial treatment  


[ ]VIII.   Hypoxemia   


[ ]IX.     Seizures


[ ]X.       New coagulopathy (eg, reduced platelet count consistent with 

disseminated intravascular  coagulation) 


[X ]XI.      Inpatient admission required [B] rather than observation care 

because of 1 or more of the following 





   []1)  Tachypnea not responsive to outpatient or observation treatment    


   [X]2)   Metabolic disorder (eg, hypoglycemia, hyperglycemia, metabolic 

acidosis) that persists despite outpatient and observation care treatment    


   []3)   Evidence of end-organ dysfunction (eg, rising creatinine, myocardial 

ischemia, rising liver function tests) that is severe or persists despite 

observation care treatment    


   []4)   Temperature > 103.1 0F (39.5 0C) (oral) that is not responsive to 

observation care treatment    


   [] 5)   Dehydration that is severe or persistent 


   []6)   Parenteral antimicrobial regimen that must be implemented on 

inpatient basis (eg, infusion or monitoring needs beyond capabilities of 

outpatient parenteral therapy)    


   [] 7)   Strict or protective (eg, laminar flow) isolation 


   [X] 8)   Other condition, treatment or monitoring requiring inpatient 

admission 





Extended stay beyond goal length of stay may be needed for(1)(3)





[ ]a)      Persistent Hypotension


[ ]b)     Positive blood cultures


[ ]c)     Lack of improvement on antimicrobial treatment (eg, continued fever) 


[ ]d)     Active comorbid illness (eg, heart failure, renal failure)


[ ]e)     High-risk febrile neutropenia 


[ ]f)      Insufficient oral intake    


[ ]g)     insufficient oral intake 


    


 








The original [facility] content created by Kimberly Ferraro has been revised. 


The portions of the content which have been revised are identified through the 

use of italic text or in bold, and [facility] 


has neither reviewed nor approved the modified material. All other unmodified 

content is copyright  [facility].





Please see references footnoted in the original [facility] edition 

2017


Admission Criteria Met: Yes

## 2017-08-11 NOTE — HISTORY AND PHYSICAL REPORT
History of Present Illness


Date of examination: 08/11/17


Date of admission: 





08/11/17


Chief complaint: 


Feeling weak and nauseous 2 days





History of present illness: 


History of Present Illness


57-year-old female presents to the ER complaining of right lateral flank wall 

redness, pain and discomfort.  Complains of generalized weakness.  No fevers or 

chills, no chest pain or shortness of breath.  In the emergency department, 

patient is found to be hyperglycemic with a blood sugar of 659, with a 

decreased sodium of 118, likely combination of pseudohyponatremia, and 

hypovolemic hyponatremia.  Patient was found to be tachycardic with a 

leukocytosis. 





Past Medical History


Hx Hypertension: Yes


Hx Diabetes: Yes


Hx Arthritis: Yes


Hx Asthma: Yes


Additional medical history: hypothyroid





- Surgical History


Additional Surgical History: fusion to back





- Social History


Smoking Status: Current Every Day Smoker


Substance Use Type: None





- Medications


Home Medications: 


 Home Medications











 Medication  Instructions  Recorded  Confirmed  Last Taken  Type


 


Escitalopram [Lexapro] 10 mg PO DAILY 08/11/17 08/11/17 08/11/17 History


 


Levothyroxine [Synthroid] 50 mcg PO QAM 08/11/17 08/11/17 08/11/17 History


 


Lisinopril [Zestril] 20 mg PO QDAY 08/11/17 08/11/17 08/11/17 History


 


PARoxetine CR (NF) [Paxil (Nf)] 12.5 mg PO QAM 08/11/17 08/11/17 08/11/17 

History


 


Rosuvastatin (Nf) [Crestor] 20 mg PO QHS 08/11/17 08/11/17 08/11/17 History























Medications and Allergies


 Allergies











Allergy/AdvReac Type Severity Reaction Status Date / Time


 


codeine Allergy  Rash Verified 08/11/17 10:49











 Home Medications











 Medication  Instructions  Recorded  Confirmed  Last Taken  Type


 


Escitalopram [Lexapro] 10 mg PO DAILY 08/11/17 08/11/17 08/11/17 History


 


Levothyroxine [Synthroid] 50 mcg PO QAM 08/11/17 08/11/17 08/11/17 History


 


Lisinopril [Zestril] 20 mg PO QDAY 08/11/17 08/11/17 08/11/17 History


 


PARoxetine CR (NF) [Paxil (Nf)] 12.5 mg PO QAM 08/11/17 08/11/17 08/11/17 

History


 


Rosuvastatin (Nf) [Crestor] 20 mg PO QHS 08/11/17 08/11/17 08/11/17 History











Active Meds: 


Active Medications





Dextrose (D50w (25gm))  0 ml IV PRN PRN


   PRN Reason: Hypoglycemia


Insulin Human Regular 100 (units/ Sodium Chloride)  100 mls @ 1 mls/hr IV TITR 

JESSY; 1 UNITS/HR


   PRN Reason: Protocol


Sodium Chloride (Sodium Chloride Flush Syringe 10 Ml)  10 ml IV PRN NR


   Stop: 08/14/17 13:59











Review of Systems


All systems: negative


Constitutional: no weight loss, no weight gain, no fever, no chills, no sweats, 

no night sweats


Ears, nose, mouth and throat: no hoarseness, no sore throat, no swelling in 

mouth, no swelling in throat


Cardiovascular: no chest pain, no orthopnea, no palpitations, no rapid/

irregular heart beat, no edema, no syncope


Respiratory: no cough, no cough with sputum, no excessive sputum, no hemoptysis

, no shortness of breath, no dyspnea on exertion


Gastrointestinal: nausea, vomiting


Musculoskeletal: no neck stiffness, no neck pain, no shooting arm pain, no arm 

numbness/tingling, no low back pain, no shooting leg pain, no leg numbness/

tingling, no redness of joints


Integumentary: redness (Rt Flank c/w early abscess)


Neurological: no seizures, no syncope


Psychiatric: no anxiety, no memory loss, no change in sleep habits, no sleep 

disturbances, no insomnia, no hypersomnia, no change in appetite, no change in 

libido


Endocrine: excessive thirst, no cold intolerance, no heat intolerance


Hematologic/Lymphatic: no easy bruising, no easy bleeding


Allergic/Immunologic: no urticaria, no allergic rhinitis, no wheezing





Exam





- Physical Exam


Narrative exam: 





In no distress





- Constitutional


Vitals: 


 











Temp Pulse Resp BP Pulse Ox


 


 98.5 F   103 H  18   104/61   100 


 


 08/11/17 13:56  08/11/17 13:56  08/11/17 13:56  08/11/17 13:56  08/11/17 13:56











General appearance: Present: no acute distress, well-nourished





- EENT


Eyes: Present: PERRL


ENT: hearing intact, clear oral mucosa





- Neck


Neck: Present: supple, normal ROM





- Respiratory


Respiratory effort: normal


Respiratory: bilateral: CTA





- Cardiovascular


Heart rate: 90


Rhythm: regular


Heart Sounds: Present: S1 & S2.  Absent: rub, click





- Extremities


Extremities: no ischemia, pulses intact, pulses symmetrical, No edema


Peripheral Pulses: within normal limits





- Abdominal


General gastrointestinal: Present: soft, non-tender, non-distended, normal 

bowel sounds, other (Rt Flank redness c/w early abscess)


Female genitourinary: Present: normal





- Rectal


Rectal Exam: deferred





- Integumentary


Integumentary: Present: clear, warm, dry





- Musculoskeletal


Musculoskeletal: gait normal, strength equal bilaterally





- Psychiatric


Psychiatric: appropriate mood/affect, intact judgment & insight





- Neurologic


Neurologic: CNII-XII intact, moves all extremities





Results





- Labs


CBC & Chem 7: 


 08/11/17 13:00





 08/12/17 08:20


Labs: 


 Laboratory Last Values











WBC  19.8 K/mm3 (4.5-11.0)  H  08/11/17  13:00    


 


RBC  4.30 M/mm3 (3.65-5.03)   08/11/17  13:00    


 


Hgb  12.2 gm/dl (10.1-14.3)   08/11/17  13:00    


 


Hct  37.3 % (30.3-42.9)   08/11/17  13:00    


 


MCV  87 fl (79-97)   08/11/17  13:00    


 


MCH  28 pg (28-32)   08/11/17  13:00    


 


MCHC  33 % (30-34)   08/11/17  13:00    


 


RDW  13.7 % (13.2-15.2)   08/11/17  13:00    


 


Plt Count  256 K/mm3 (140-440)   08/11/17  13:00    


 


Add Manual Diff  Complete   08/11/17  13:00    


 


Total Counted  100   08/11/17  13:00    


 


Seg Neutrophils %  Np   08/11/17  13:00    


 


Seg Neuts % (Manual)  84.0 % (40.0-70.0)  H  08/11/17  13:00    


 


Band Neutrophils %  14.0 %  08/11/17  13:00    


 


Lymphocytes % (Manual)  2.0 % (13.4-35.0)  L  08/11/17  13:00    


 


Reactive Lymphs % (Man)  0 %  08/11/17  13:00    


 


Monocytes % (Manual)  0 % (0.0-7.3)   08/11/17  13:00    


 


Eosinophils % (Manual)  0 % (0.0-4.3)   08/11/17  13:00    


 


Basophils % (Manual)  0 % (0.0-1.8)   08/11/17  13:00    


 


Metamyelocytes %  0 %  08/11/17  13:00    


 


Myelocytes %  0 %  08/11/17  13:00    


 


Promyelocytes %  0 %  08/11/17  13:00    


 


Blast Cells %  0 %  08/11/17  13:00    


 


Nucleated RBC %  Not Reportable   08/11/17  13:00    


 


Seg Neutrophils # Man  16.6 K/mm3 (1.8-7.7)  H  08/11/17  13:00    


 


Band Neutrophils #  2.8 K/mm3  08/11/17  13:00    


 


Lymphocytes # (Manual)  0.4 K/mm3 (1.2-5.4)  L  08/11/17  13:00    


 


Abs React Lymphs (Man)  0.0 K/mm3  08/11/17  13:00    


 


Monocytes # (Manual)  0.0 K/mm3 (0.0-0.8)   08/11/17  13:00    


 


Eosinophils # (Manual)  0.0 K/mm3 (0.0-0.4)   08/11/17  13:00    


 


Basophils # (Manual)  0.0 K/mm3 (0.0-0.1)   08/11/17  13:00    


 


Metamyelocytes #  0.0 K/mm3  08/11/17  13:00    


 


Myelocytes #  0.0 K/mm3  08/11/17  13:00    


 


Promyelocytes #  0.0 K/mm3  08/11/17  13:00    


 


Blast Cells #  0.0 K/mm3  08/11/17  13:00    


 


WBC Morphology  Not Reportable   08/11/17  13:00    


 


Hypersegmented Neuts  Not Reportable   08/11/17  13:00    


 


Hyposegmented Neuts  Not Reportable   08/11/17  13:00    


 


Hypogranular Neuts  Not Reportable   08/11/17  13:00    


 


Smudge Cells  Not Reportable   08/11/17  13:00    


 


Toxic Granulation  Not Reportable   08/11/17  13:00    


 


Toxic Vacuolation  Not Reportable   08/11/17  13:00    


 


Dohle Bodies  Not Reportable   08/11/17  13:00    


 


Pelger-Huet Anomaly  Not Reportable   08/11/17  13:00    


 


Luis Rods  Not Reportable   08/11/17  13:00    


 


Platelet Estimate  Appears normal   08/11/17  13:00    


 


Clumped Platelets  Not Reportable   08/11/17  13:00    


 


Plt Clumps, EDTA  Not Reportable   08/11/17  13:00    


 


Large Platelets  Few   08/11/17  13:00    


 


Giant Platelets  Not Reportable   08/11/17  13:00    


 


Platelet Satelliting  Not Reportable   08/11/17  13:00    


 


Plt Morphology Comment  Not Reportable   08/11/17  13:00    


 


RBC Morphology  Normal   08/11/17  13:00    


 


Dimorphic RBCs  Not Reportable   08/11/17  13:00    


 


Polychromasia  Not Reportable   08/11/17  13:00    


 


Hypochromasia  Not Reportable   08/11/17  13:00    


 


Poikilocytosis  Not Reportable   08/11/17  13:00    


 


Anisocytosis  Not Reportable   08/11/17  13:00    


 


Microcytosis  Not Reportable   08/11/17  13:00    


 


Macrocytosis  Not Reportable   08/11/17  13:00    


 


Spherocytes  Not Reportable   08/11/17  13:00    


 


Pappenheimer Bodies  Not Reportable   08/11/17  13:00    


 


Sickle Cells  Not Reportable   08/11/17  13:00    


 


Target Cells  Not Reportable   08/11/17  13:00    


 


Tear Drop Cells  Not Reportable   08/11/17  13:00    


 


Ovalocytes  Not Reportable   08/11/17  13:00    


 


Helmet Cells  Not Reportable   08/11/17  13:00    


 


Benn[place] Bodies  Not Reportable   08/11/17  13:00    


 


Cabot Rings  Not Reportable   08/11/17  13:00    


 


Sondheimer Cells  Not Reportable   08/11/17  13:00    


 


Bite Cells  Not Reportable   08/11/17  13:00    


 


Crenated Cell  Not Reportable   08/11/17  13:00    


 


Elliptocytes  Not Reportable   08/11/17  13:00    


 


Acanthocytes (Spur)  Not Reportable   08/11/17  13:00    


 


Rouleaux  Not Reportable   08/11/17  13:00    


 


Hemoglobin C Crystals  Not Reportable   08/11/17  13:00    


 


Schistocytes  Not Reportable   08/11/17  13:00    


 


Malaria parasites  Not Reportable   08/11/17  13:00    


 


Thor Bodies  Not Reportable   08/11/17  13:00    


 


Hem Pathologist Commnt  No   08/11/17  13:00    


 


VBG pH  7.347  (7.320-7.420)   08/11/17  13:54    


 


Sodium  118 mmol/L (137-145)  L*  08/11/17  13:00    


 


Potassium  4.6 mmol/L (3.6-5.0)   08/11/17  13:00    


 


Chloride  79.1 mmol/L ()  L  08/11/17  13:00    


 


Carbon Dioxide  22 mmol/L (22-30)   08/11/17  13:00    


 


Anion Gap  22 mmol/L  08/11/17  13:00    


 


BUN  32 mg/dL (7-17)  H  08/11/17  13:00    


 


Creatinine  1.3 mg/dL (0.7-1.2)  H  08/11/17  13:00    


 


Estimated GFR  43 ml/min  08/11/17  13:00    


 


BUN/Creatinine Ratio  24.61 %  08/11/17  13:00    


 


Glucose  659 mg/dL ()  H*  08/11/17  13:00    


 


POC Glucose  > 500  ()  H  08/11/17  14:57    


 


Ketones Quantitative  Negative  (Negative)   08/11/17  13:54    


 


Lactic Acid  3.10 mmol/L (0.7-2.0)  H*  08/11/17  13:54    


 


Calcium  8.5 mg/dL (8.4-10.2)   08/11/17  13:00    


 


Phosphorus  3.70 mg/dL (2.5-4.5)   08/11/17  13:54    


 


Magnesium  2.10 mg/dL (1.7-2.3)   08/11/17  13:54    


 


Total Bilirubin  0.60 mg/dL (0.1-1.2)   08/11/17  13:00    


 


AST  14 units/L (5-40)   08/11/17  13:00    


 


ALT  13 units/L (7-56)   08/11/17  13:00    


 


Alkaline Phosphatase  105 units/L ()   08/11/17  13:00    


 


Total Protein  6.9 g/dL (6.3-8.2)   08/11/17  13:00    


 


Albumin  2.7 g/dL (3.9-5)  L  08/11/17  13:00    


 


Albumin/Globulin Ratio  0.6 %  08/11/17  13:00    


 


Urine Color  Yellow  (Yellow)   08/11/17  Unknown


 


Urine Turbidity  Cloudy  (Clear)   08/11/17  Unknown


 


Urine pH  5.0  (5.0-7.0)   08/11/17  Unknown


 


Ur Specific Gravity  1.024  (1.003-1.030)   08/11/17  Unknown


 


Urine Protein  <15 mg/dl mg/dL (Negative)   08/11/17  Unknown


 


Urine Glucose (UA)  >=500 mg/dL (Negative)   08/11/17  Unknown


 


Urine Ketones  20 mg/dL (Negative)   08/11/17  Unknown


 


Urine Blood  Sm  (Negative)   08/11/17  Unknown


 


Urine Nitrite  Neg  (Negative)   08/11/17  Unknown


 


Urine Bilirubin  Neg  (Negative)   08/11/17  Unknown


 


Urine Urobilinogen  2.0 mg/dL (<2.0)   08/11/17  Unknown


 


Ur Leukocyte Esterase  Lg  (Negative)   08/11/17  Unknown


 


Urine WBC (Auto)  > 182.0 /HPF (0.0-6.0)  H  08/11/17  Unknown


 


Urine RBC (Auto)  7.0 /HPF (0.0-6.0)   08/11/17  Unknown


 


U Epithel Cells (Auto)  9.0 /HPF (0-13.0)   08/11/17  Unknown


 


Urine Bacteria (Auto)  1+ /HPF (Negative)   08/11/17  Unknown


 


Urine Mucus  Few /HPF  08/11/17  Unknown








 Short CBC











  08/11/17 Range/Units





  13:00 


 


WBC  19.8 H  (4.5-11.0)  K/mm3


 


Hgb  12.2  (10.1-14.3)  gm/dl


 


Hct  37.3  (30.3-42.9)  %


 


Plt Count  256  (140-440)  K/mm3








 BMP











  08/11/17 08/11/17 08/11/17





  13:00 16:18 19:38


 


Sodium  118 L*  126 L D  130 L


 


Potassium  4.6  4.7  4.5


 


Chloride  79.1 L  87.1 L  94.1 L


 


Carbon Dioxide  22  21 L  22


 


BUN  32 H  31 H  34 H


 


Creatinine  1.3 H  1.2  1.3 H


 


Glucose  659 H*  410 H  223 H


 


Calcium  8.5  8.0 L  7.9 L














  08/11/17 08/11/17 08/12/17





  22:30 23:49 01:11


 


Sodium  129 L  129 L  124 L


 


Potassium  3.8  3.7  3.9


 


Chloride  94.2 L  93.5 L  92.3 L


 


Carbon Dioxide  22  22  16 L


 


BUN  34 H  34 H  34 H


 


Creatinine  1.1  1.1  1.1


 


Glucose  77  104 H  103 H


 


Calcium  8.0 L  7.8 L  7.9 L














  08/12/17 08/12/17 08/12/17





  02:25 05:33 08:20


 


Sodium  128 L  129 L  129 L


 


Potassium  3.8  4.0  3.8


 


Chloride  94.0 L  95.3 L  96.0 L


 


Carbon Dioxide  19 L  17 L  18 L


 


BUN  34 H  36 H  33 H


 


Creatinine  1.1  1.1  1.0


 


Glucose  136 H  155 H  124 H


 


Calcium  8.1 L  7.7 L  7.9 L








 Liver Function











  08/11/17 Range/Units





  13:00 


 


Total Bilirubin  0.60  (0.1-1.2)  mg/dL


 


AST  14  (5-40)  units/L


 


ALT  13  (7-56)  units/L


 


Alkaline Phosphatase  105  ()  units/L


 


Albumin  2.7 L  (3.9-5)  g/dL








 Urine











  08/11/17 Range/Units





  Unknown 


 


Urine Color  Yellow  (Yellow)  


 


Urine pH  5.0  (5.0-7.0)  


 


Ur Specific Gravity  1.024  (1.003-1.030)  


 


Urine Protein  <15 mg/dl  (Negative)  mg/dL


 


Urine Glucose (UA)  >=500  (Negative)  mg/dL














- Imaging and Cardiology


EKG: report reviewed


Chest x-ray: report reviewed





Assessment and Plan


Advance Directives: Yes (Full code)


VTE prophylaxis?: Chemical


Plan of care discussed with patient/family: Yes





- Patient Problems


(1) DKA (diabetic ketoacidoses)


Current Visit: Yes   Status: Acute   


Qualifiers: 


   Diabetes mellitus type: type 2   Diabetes mellitus complication detail: D 


Plan to address problem: 


DKA protocol with IV insulin and IV fluids








(2) Abscess


Current Visit: Yes   Status: Acute   


Plan to address problem: 


Rt flank abscess-IV abx started 








(3) Acute renal failure


Current Visit: Yes   Status: Acute   


Qualifiers: 


   Acute renal failure type: unspecified   Qualified Code(s): N17.9 - Acute 

kidney failure, unspecified   


Plan to address problem: 


Renal consult ordered








(4) Hyponatremia


Current Visit: Yes   Status: Acute   


Plan to address problem: 


Pseudo natremia should correct with IV Insulin and IV fluids








(5) UTI (urinary tract infection)


Current Visit: Yes   Status: Acute   


Qualifiers: 


   Urinary tract infection type: acute cystitis   Hematuria presence: H   

Encounter type: E 


Plan to address problem: 


IV Rocephin for now pending C &S








(6) Sepsis


Current Visit: Yes   Status: Acute   


Qualifiers: 


   Sepsis type: S 


Plan to address problem: 


IV rocephin and IV Vancomycin








(7) DVT prophylaxis


Current Visit: Yes   Status: Acute   


Plan to address problem: 


on lovenox

## 2017-08-12 LAB
ANION GAP SERPL CALC-SCNC: 17 MMOL/L
ANION GAP SERPL CALC-SCNC: 17 MMOL/L
ANION GAP SERPL CALC-SCNC: 18 MMOL/L
ANION GAP SERPL CALC-SCNC: 19 MMOL/L
ANION GAP SERPL CALC-SCNC: 19 MMOL/L
ANION GAP SERPL CALC-SCNC: 20 MMOL/L
ANION GAP SERPL CALC-SCNC: 21 MMOL/L
ANION GAP SERPL CALC-SCNC: 21 MMOL/L
BUN SERPL-MCNC: 32 MG/DL (ref 7–17)
BUN SERPL-MCNC: 33 MG/DL (ref 7–17)
BUN SERPL-MCNC: 33 MG/DL (ref 7–17)
BUN SERPL-MCNC: 34 MG/DL (ref 7–17)
BUN SERPL-MCNC: 36 MG/DL (ref 7–17)
BUN/CREAT SERPL: 27.5 %
BUN/CREAT SERPL: 30.9 %
BUN/CREAT SERPL: 32 %
BUN/CREAT SERPL: 32.72 %
BUN/CREAT SERPL: 33 %
CALCIUM SERPL-MCNC: 7.7 MG/DL (ref 8.4–10.2)
CALCIUM SERPL-MCNC: 7.7 MG/DL (ref 8.4–10.2)
CALCIUM SERPL-MCNC: 7.8 MG/DL (ref 8.4–10.2)
CALCIUM SERPL-MCNC: 7.9 MG/DL (ref 8.4–10.2)
CALCIUM SERPL-MCNC: 7.9 MG/DL (ref 8.4–10.2)
CALCIUM SERPL-MCNC: 8 MG/DL (ref 8.4–10.2)
CALCIUM SERPL-MCNC: 8 MG/DL (ref 8.4–10.2)
CALCIUM SERPL-MCNC: 8.1 MG/DL (ref 8.4–10.2)
CHLORIDE SERPL-SCNC: 92.3 MMOL/L (ref 98–107)
CHLORIDE SERPL-SCNC: 93.5 MMOL/L (ref 98–107)
CHLORIDE SERPL-SCNC: 94 MMOL/L (ref 98–107)
CHLORIDE SERPL-SCNC: 94.2 MMOL/L (ref 98–107)
CHLORIDE SERPL-SCNC: 94.6 MMOL/L (ref 98–107)
CHLORIDE SERPL-SCNC: 94.9 MMOL/L (ref 98–107)
CHLORIDE SERPL-SCNC: 95.3 MMOL/L (ref 98–107)
CHLORIDE SERPL-SCNC: 96 MMOL/L (ref 98–107)
CO2 SERPL-SCNC: 16 MMOL/L (ref 22–30)
CO2 SERPL-SCNC: 17 MMOL/L (ref 22–30)
CO2 SERPL-SCNC: 17 MMOL/L (ref 22–30)
CO2 SERPL-SCNC: 18 MMOL/L (ref 22–30)
CO2 SERPL-SCNC: 19 MMOL/L (ref 22–30)
CO2 SERPL-SCNC: 21 MMOL/L (ref 22–30)
CO2 SERPL-SCNC: 22 MMOL/L (ref 22–30)
CO2 SERPL-SCNC: 22 MMOL/L (ref 22–30)
GLUCOSE SERPL-MCNC: 103 MG/DL (ref 65–100)
GLUCOSE SERPL-MCNC: 104 MG/DL (ref 65–100)
GLUCOSE SERPL-MCNC: 124 MG/DL (ref 65–100)
GLUCOSE SERPL-MCNC: 136 MG/DL (ref 65–100)
GLUCOSE SERPL-MCNC: 155 MG/DL (ref 65–100)
GLUCOSE SERPL-MCNC: 217 MG/DL (ref 65–100)
GLUCOSE SERPL-MCNC: 292 MG/DL (ref 65–100)
GLUCOSE SERPL-MCNC: 77 MG/DL (ref 65–100)
MAGNESIUM SERPL-MCNC: 1.9 MG/DL (ref 1.7–2.3)
PHOSPHATE SERPL-MCNC: 2.1 MG/DL (ref 2.5–4.5)
POTASSIUM SERPL-SCNC: 3.7 MMOL/L (ref 3.6–5)
POTASSIUM SERPL-SCNC: 3.8 MMOL/L (ref 3.6–5)
POTASSIUM SERPL-SCNC: 3.9 MMOL/L (ref 3.6–5)
POTASSIUM SERPL-SCNC: 4 MMOL/L (ref 3.6–5)
POTASSIUM SERPL-SCNC: 4 MMOL/L (ref 3.6–5)
POTASSIUM SERPL-SCNC: 4.1 MMOL/L (ref 3.6–5)
SODIUM SERPL-SCNC: 124 MMOL/L (ref 137–145)
SODIUM SERPL-SCNC: 128 MMOL/L (ref 137–145)
SODIUM SERPL-SCNC: 128 MMOL/L (ref 137–145)
SODIUM SERPL-SCNC: 129 MMOL/L (ref 137–145)
SODIUM SERPL-SCNC: 130 MMOL/L (ref 137–145)

## 2017-08-12 RX ADMIN — INSULIN ASPART SCH UNITS: 100 INJECTION, SOLUTION INTRAVENOUS; SUBCUTANEOUS at 17:12

## 2017-08-12 RX ADMIN — ESCITALOPRAM OXALATE SCH MG: 10 TABLET ORAL at 12:28

## 2017-08-12 RX ADMIN — SODIUM CHLORIDE SCH MLS/HR: 0.9 INJECTION, SOLUTION INTRAVENOUS at 15:24

## 2017-08-12 RX ADMIN — INSULIN ASPART SCH UNITS: 100 INJECTION, SOLUTION INTRAVENOUS; SUBCUTANEOUS at 12:22

## 2017-08-12 RX ADMIN — LISINOPRIL SCH MG: 20 TABLET ORAL at 12:27

## 2017-08-12 RX ADMIN — INSULIN ASPART SCH UNITS: 100 INJECTION, SOLUTION INTRAVENOUS; SUBCUTANEOUS at 21:31

## 2017-08-12 RX ADMIN — HYDROMORPHONE HYDROCHLORIDE PRN MG: 1 INJECTION, SOLUTION INTRAMUSCULAR; INTRAVENOUS; SUBCUTANEOUS at 21:31

## 2017-08-12 RX ADMIN — VANCOMYCIN HYDROCHLORIDE SCH MLS/HR: 100 INJECTION, POWDER, LYOPHILIZED, FOR SOLUTION INTRAVENOUS at 17:19

## 2017-08-12 RX ADMIN — PAROXETINE HYDROCHLORIDE SCH MG: 12.5 TABLET, FILM COATED, EXTENDED RELEASE ORAL at 14:07

## 2017-08-12 RX ADMIN — ACYCLOVIR SODIUM SCH MLS/HR: 500 INJECTION, SOLUTION INTRAVENOUS at 21:49

## 2017-08-12 RX ADMIN — LEVOTHYROXINE SODIUM SCH MCG: 0.05 TABLET ORAL at 06:05

## 2017-08-12 NOTE — PROGRESS NOTE
Assessment and Plan


Assessment and plan: 





DKA.  Resolved.  Patient will be transitioned to her home regimen of long 

acting 70/30 insulin.  Discontinue IV insulin drip.





Sepsis.  Continue IV antibiotics.





Right flank abscess.  Surgery consultation.  Continue IV antibiotics.  Consider 

ID consultation.





Acute renal failure.  Nephrology consultation.  Etiology likely secondary to 

sepsis/ATN/BRANDY





Pseudohyponatremia.  Follow BMP.





UTI.  Continue IV antibiotics.





DVT prophylaxis.  Continue Lovenox.











History


Interval history: 





No new issues overnight.  Patient sitting up in a chair comfortably.





Hospitalist Physical





- Constitutional


Vitals: 


 











Temp Pulse Resp BP Pulse Ox


 


 99.1 F   109 H  17   124/70   98 


 


 08/12/17 08:00  08/12/17 12:27  08/12/17 10:00  08/12/17 12:27  08/12/17 10:00











General appearance: Present: no acute distress, well-nourished





- EENT


Eyes: Present: PERRL, EOM intact


ENT: hearing intact, clear oral mucosa, dentition normal





- Neck


Neck: Present: supple, normal ROM





- Respiratory


Respiratory effort: normal


Respiratory: bilateral: CTA





- Cardiovascular


Rhythm: regular


Heart Sounds: Present: S1 & S2.  Absent: gallop, rub





- Extremities


Extremities: no ischemia, No edema, Full ROM





- Abdominal


General gastrointestinal: soft, non-tender, non-distended, normal bowel sounds





- Integumentary


Integumentary: Present: clear, warm, dry





- Neurologic


Neurologic: CNII-XII intact, moves all extremities





Results





- Labs


CBC & Chem 7: 


 08/11/17 13:00





 08/12/17 08:20


Labs: 


 Laboratory Last Values











WBC  19.8 K/mm3 (4.5-11.0)  H  08/11/17  13:00    


 


RBC  4.30 M/mm3 (3.65-5.03)   08/11/17  13:00    


 


Hgb  12.2 gm/dl (10.1-14.3)   08/11/17  13:00    


 


Hct  37.3 % (30.3-42.9)   08/11/17  13:00    


 


MCV  87 fl (79-97)   08/11/17  13:00    


 


MCH  28 pg (28-32)   08/11/17  13:00    


 


MCHC  33 % (30-34)   08/11/17  13:00    


 


RDW  13.7 % (13.2-15.2)   08/11/17  13:00    


 


Plt Count  256 K/mm3 (140-440)   08/11/17  13:00    


 


Add Manual Diff  Complete   08/11/17  13:00    


 


Total Counted  100   08/11/17  13:00    


 


Seg Neutrophils %  Np   08/11/17  13:00    


 


Seg Neuts % (Manual)  84.0 % (40.0-70.0)  H  08/11/17  13:00    


 


Band Neutrophils %  14.0 %  08/11/17  13:00    


 


Lymphocytes % (Manual)  2.0 % (13.4-35.0)  L  08/11/17  13:00    


 


Reactive Lymphs % (Man)  0 %  08/11/17  13:00    


 


Monocytes % (Manual)  0 % (0.0-7.3)   08/11/17  13:00    


 


Eosinophils % (Manual)  0 % (0.0-4.3)   08/11/17  13:00    


 


Basophils % (Manual)  0 % (0.0-1.8)   08/11/17  13:00    


 


Metamyelocytes %  0 %  08/11/17  13:00    


 


Myelocytes %  0 %  08/11/17  13:00    


 


Promyelocytes %  0 %  08/11/17  13:00    


 


Blast Cells %  0 %  08/11/17  13:00    


 


Nucleated RBC %  Not Reportable   08/11/17  13:00    


 


Seg Neutrophils # Man  16.6 K/mm3 (1.8-7.7)  H  08/11/17  13:00    


 


Band Neutrophils #  2.8 K/mm3  08/11/17  13:00    


 


Lymphocytes # (Manual)  0.4 K/mm3 (1.2-5.4)  L  08/11/17  13:00    


 


Abs React Lymphs (Man)  0.0 K/mm3  08/11/17  13:00    


 


Monocytes # (Manual)  0.0 K/mm3 (0.0-0.8)   08/11/17  13:00    


 


Eosinophils # (Manual)  0.0 K/mm3 (0.0-0.4)   08/11/17  13:00    


 


Basophils # (Manual)  0.0 K/mm3 (0.0-0.1)   08/11/17  13:00    


 


Metamyelocytes #  0.0 K/mm3  08/11/17  13:00    


 


Myelocytes #  0.0 K/mm3  08/11/17  13:00    


 


Promyelocytes #  0.0 K/mm3  08/11/17  13:00    


 


Blast Cells #  0.0 K/mm3  08/11/17  13:00    


 


WBC Morphology  Not Reportable   08/11/17  13:00    


 


Hypersegmented Neuts  Not Reportable   08/11/17  13:00    


 


Hyposegmented Neuts  Not Reportable   08/11/17  13:00    


 


Hypogranular Neuts  Not Reportable   08/11/17  13:00    


 


Smudge Cells  Not Reportable   08/11/17  13:00    


 


Toxic Granulation  Not Reportable   08/11/17  13:00    


 


Toxic Vacuolation  Not Reportable   08/11/17  13:00    


 


Dohle Bodies  Not Reportable   08/11/17  13:00    


 


Pelger-Huet Anomaly  Not Reportable   08/11/17  13:00    


 


Luis Rods  Not Reportable   08/11/17  13:00    


 


Platelet Estimate  Appears normal   08/11/17  13:00    


 


Clumped Platelets  Not Reportable   08/11/17  13:00    


 


Plt Clumps, EDTA  Not Reportable   08/11/17  13:00    


 


Large Platelets  Few   08/11/17  13:00    


 


Giant Platelets  Not Reportable   08/11/17  13:00    


 


Platelet Satelliting  Not Reportable   08/11/17  13:00    


 


Plt Morphology Comment  Not Reportable   08/11/17  13:00    


 


RBC Morphology  Normal   08/11/17  13:00    


 


Dimorphic RBCs  Not Reportable   08/11/17  13:00    


 


Polychromasia  Not Reportable   08/11/17  13:00    


 


Hypochromasia  Not Reportable   08/11/17  13:00    


 


Poikilocytosis  Not Reportable   08/11/17  13:00    


 


Anisocytosis  Not Reportable   08/11/17  13:00    


 


Microcytosis  Not Reportable   08/11/17  13:00    


 


Macrocytosis  Not Reportable   08/11/17  13:00    


 


Spherocytes  Not Reportable   08/11/17  13:00    


 


Pappenheimer Bodies  Not Reportable   08/11/17  13:00    


 


Sickle Cells  Not Reportable   08/11/17  13:00    


 


Target Cells  Not Reportable   08/11/17  13:00    


 


Tear Drop Cells  Not Reportable   08/11/17  13:00    


 


Ovalocytes  Not Reportable   08/11/17  13:00    


 


Helmet Cells  Not Reportable   08/11/17  13:00    


 


Bennett-Linndale Bodies  Not Reportable   08/11/17  13:00    


 


Cabot Rings  Not Reportable   08/11/17  13:00    


 


Vermilion Cells  Not Reportable   08/11/17  13:00    


 


Bite Cells  Not Reportable   08/11/17  13:00    


 


Crenated Cell  Not Reportable   08/11/17  13:00    


 


Elliptocytes  Not Reportable   08/11/17  13:00    


 


Acanthocytes (Spur)  Not Reportable   08/11/17  13:00    


 


Rouleaux  Not Reportable   08/11/17  13:00    


 


Hemoglobin C Crystals  Not Reportable   08/11/17  13:00    


 


Schistocytes  Not Reportable   08/11/17  13:00    


 


Malaria parasites  Not Reportable   08/11/17  13:00    


 


Thor Bodies  Not Reportable   08/11/17  13:00    


 


Hem Pathologist Commnt  No   08/11/17  13:00    


 


VBG pH  7.284  (7.320-7.420)  L  08/11/17  16:18    


 


Sodium  129 mmol/L (137-145)  L  08/12/17  08:20    


 


Potassium  3.8 mmol/L (3.6-5.0)   08/12/17  08:20    


 


Chloride  96.0 mmol/L ()  L  08/12/17  08:20    


 


Carbon Dioxide  18 mmol/L (22-30)  L  08/12/17  08:20    


 


Anion Gap  19 mmol/L  08/12/17  08:20    


 


BUN  33 mg/dL (7-17)  H  08/12/17  08:20    


 


Creatinine  1.0 mg/dL (0.7-1.2)   08/12/17  08:20    


 


Estimated GFR  58 ml/min  08/12/17  08:20    


 


BUN/Creatinine Ratio  33.00 %  08/12/17  08:20    


 


Glucose  124 mg/dL ()  H  08/12/17  08:20    


 


POC Glucose  295  ()  H  08/12/17  12:13    


 


Ketones Quantitative  Negative  (Negative)   08/11/17  13:54    


 


Lactic Acid  3.10 mmol/L (0.7-2.0)  H*  08/11/17  13:54    


 


Calcium  7.9 mg/dL (8.4-10.2)  L  08/12/17  08:20    


 


Phosphorus  2.10 mg/dL (2.5-4.5)  L  08/11/17  22:30    


 


Magnesium  1.90 mg/dL (1.7-2.3)   08/11/17  22:30    


 


Total Bilirubin  0.60 mg/dL (0.1-1.2)   08/11/17  13:00    


 


AST  14 units/L (5-40)   08/11/17  13:00    


 


ALT  13 units/L (7-56)   08/11/17  13:00    


 


Alkaline Phosphatase  105 units/L ()   08/11/17  13:00    


 


Total Protein  6.9 g/dL (6.3-8.2)   08/11/17  13:00    


 


Albumin  2.7 g/dL (3.9-5)  L  08/11/17  13:00    


 


Albumin/Globulin Ratio  0.6 %  08/11/17  13:00    


 


Urine Color  Yellow  (Yellow)   08/11/17  Unknown


 


Urine Turbidity  Cloudy  (Clear)   08/11/17  Unknown


 


Urine pH  5.0  (5.0-7.0)   08/11/17  Unknown


 


Ur Specific Gravity  1.024  (1.003-1.030)   08/11/17  Unknown


 


Urine Protein  <15 mg/dl mg/dL (Negative)   08/11/17  Unknown


 


Urine Glucose (UA)  >=500 mg/dL (Negative)   08/11/17  Unknown


 


Urine Ketones  20 mg/dL (Negative)   08/11/17  Unknown


 


Urine Blood  Sm  (Negative)   08/11/17  Unknown


 


Urine Nitrite  Neg  (Negative)   08/11/17  Unknown


 


Urine Bilirubin  Neg  (Negative)   08/11/17  Unknown


 


Urine Urobilinogen  2.0 mg/dL (<2.0)   08/11/17  Unknown


 


Ur Leukocyte Esterase  Lg  (Negative)   08/11/17  Unknown


 


Urine WBC (Auto)  > 182.0 /HPF (0.0-6.0)  H  08/11/17  Unknown


 


Urine RBC (Auto)  7.0 /HPF (0.0-6.0)   08/11/17  Unknown


 


U Epithel Cells (Auto)  9.0 /HPF (0-13.0)   08/11/17  Unknown


 


Urine Bacteria (Auto)  1+ /HPF (Negative)   08/11/17  Unknown


 


Urine Mucus  Few /HPF  08/11/17  Unknown

## 2017-08-12 NOTE — PROGRESS NOTE
Subjective


Date of service: 08/12/17


Interval history: 





Seen and examined at bedside; 24 hour events reviewed; nursing and respiratory 

care staff consulted; no adverse overnight events reported to me;





Objective


 Vital Signs - 12hr











  08/12/17 08/12/17 08/12/17





  03:50 04:17 08:00


 


Temperature 98.8 F  99.1 F


 


Pulse Rate   


 


Pulse Rate [  98 H 99 H





Apical]   


 


Pulse Rate [  97 H 





From Monitor]   


 


Respiratory  17 17





Rate   


 


Blood Pressure   


 


O2 Sat by Pulse  99 98





Oximetry   














  08/12/17 08/12/17 08/12/17





  10:00 12:00 12:27


 


Temperature  98.8 F 


 


Pulse Rate 99 H  109 H


 


Pulse Rate [   





Apical]   


 


Pulse Rate [   





From Monitor]   


 


Respiratory 17  





Rate   


 


Blood Pressure   124/70


 


O2 Sat by Pulse 98  





Oximetry   











CBC and BMP: 


 08/11/17 13:00





 08/12/17 08:20


Abnormal lab findings: 


 Abnormal Labs











  08/11/17 08/11/17 08/11/17





  16:18 16:18 16:39


 


VBG pH  7.284 L  


 


Sodium   126 L D 


 


Chloride   87.1 L 


 


Carbon Dioxide   21 L 


 


BUN   31 H 


 


Creatinine   


 


Glucose   410 H 


 


POC Glucose    334 H


 


Calcium   8.0 L 


 


Phosphorus   


 


Urine WBC (Auto)   














  08/11/17 08/11/17 08/11/17





  18:02 19:08 19:38


 


VBG pH   


 


Sodium    130 L


 


Chloride    94.1 L


 


Carbon Dioxide   


 


BUN    34 H


 


Creatinine    1.3 H


 


Glucose    223 H


 


POC Glucose  364 H  313 H 


 


Calcium    7.9 L


 


Phosphorus   


 


Urine WBC (Auto)   














  08/11/17 08/11/17 08/11/17





  20:07 21:00 21:47


 


VBG pH   


 


Sodium   


 


Chloride   


 


Carbon Dioxide   


 


BUN   


 


Creatinine   


 


Glucose   


 


POC Glucose  266 H  171 H  132 H


 


Calcium   


 


Phosphorus   


 


Urine WBC (Auto)   














  08/11/17 08/11/17 08/11/17





  22:30 23:49 Unknown


 


VBG pH   


 


Sodium  129 L  129 L 


 


Chloride  94.2 L  93.5 L 


 


Carbon Dioxide   


 


BUN  34 H  34 H 


 


Creatinine   


 


Glucose   104 H 


 


POC Glucose   


 


Calcium  8.0 L  7.8 L 


 


Phosphorus  2.10 L  


 


Urine WBC (Auto)    > 182.0 H














  08/12/17 08/12/17 08/12/17





  01:11 01:19 02:00


 


VBG pH   


 


Sodium  124 L  


 


Chloride  92.3 L  


 


Carbon Dioxide  16 L  


 


BUN  34 H  


 


Creatinine   


 


Glucose  103 H  


 


POC Glucose   124 H  111 H


 


Calcium  7.9 L  


 


Phosphorus   


 


Urine WBC (Auto)   














  08/12/17 08/12/17 08/12/17





  02:25 03:06 04:17


 


VBG pH   


 


Sodium  128 L  


 


Chloride  94.0 L  


 


Carbon Dioxide  19 L  


 


BUN  34 H  


 


Creatinine   


 


Glucose  136 H  


 


POC Glucose   164 H  181 H


 


Calcium  8.1 L  


 


Phosphorus   


 


Urine WBC (Auto)   














  08/12/17 08/12/17 08/12/17





  04:53 05:32 05:33


 


VBG pH   


 


Sodium    129 L


 


Chloride    95.3 L


 


Carbon Dioxide    17 L


 


BUN    36 H


 


Creatinine   


 


Glucose    155 H


 


POC Glucose  184 H  171 H 


 


Calcium    7.7 L


 


Phosphorus   


 


Urine WBC (Auto)   














  08/12/17 08/12/17 08/12/17





  07:56 08:20 09:04


 


VBG pH   


 


Sodium   129 L 


 


Chloride   96.0 L 


 


Carbon Dioxide   18 L 


 


BUN   33 H 


 


Creatinine   


 


Glucose   124 H 


 


POC Glucose  131 H   126 H


 


Calcium   7.9 L 


 


Phosphorus   


 


Urine WBC (Auto)   














  08/12/17 08/12/17





  09:58 12:13


 


VBG pH  


 


Sodium  


 


Chloride  


 


Carbon Dioxide  


 


BUN  


 


Creatinine  


 


Glucose  


 


POC Glucose  142 H  295 H


 


Calcium  


 


Phosphorus  


 


Urine WBC (Auto)

## 2017-08-12 NOTE — CONSULTATION
History of Present Illness


Consult date: 08/12/17


Requesting physician: YARED LERNER


Reason for consult: other (DKA)


History of present illness: 





PULMONARY/CCM CONSULT NOTE (Full dictation # [id number])


Please see dictated notes for full details





Medications and Allergies


 Allergies











Allergy/AdvReac Type Severity Reaction Status Date / Time


 


codeine Allergy  Rash Verified 08/11/17 10:49











 Home Medications











 Medication  Instructions  Recorded  Confirmed  Last Taken  Type


 


Escitalopram [Lexapro] 10 mg PO DAILY 08/11/17 08/11/17 08/11/17 History


 


Levothyroxine [Synthroid] 50 mcg PO QAM 08/11/17 08/11/17 08/11/17 History


 


Lisinopril [Zestril] 20 mg PO QDAY 08/11/17 08/11/17 08/11/17 History


 


PARoxetine CR (NF) [Paxil (Nf)] 12.5 mg PO QAM 08/11/17 08/11/17 08/11/17 

History


 


Rosuvastatin (Nf) [Crestor] 20 mg PO QHS 08/11/17 08/11/17 08/11/17 History











Active Meds: 


Active Medications





Acetaminophen (Tylenol)  650 mg PO Q4H PRN


   PRN Reason: Pain MILD(1-3)/Fever >100.5/HA


Acetaminophen/Hydrocodone Bitart (Norco 5/325)  1 each PO Q4H PRN


   PRN Reason: Pain, Moderate (4-6)


Atorvastatin Calcium (Lipitor)  40 mg PO QHS [facility]


   Last Admin: 08/11/17 21:35 Dose:  40 mg


Bisacodyl (Dulcolax)  10 mg ME QDAY PRN


   PRN Reason: Constipation unrelieved by MOM


Dextrose (D50w (25gm))  0 ml IV PRN PRN


   PRN Reason: Hypoglycemia


Dextrose (D50w (25gm))  0 gm IV PRN PRN


   PRN Reason: Hypoglycemia


   Last Admin: 08/11/17 23:55 Dose:  10 gm


Escitalopram Oxalate (Lexapro)  10 mg PO DAILY [facility]


   Last Admin: 08/12/17 12:28 Dose:  10 mg


Hydromorphone HCl (Dilaudid)  0.5 mg IV Q3H PRN


   PRN Reason: Pain , Severe (7-10)


   Last Admin: 08/11/17 21:37 Dose:  0.5 mg


Sodium Chloride (Nacl 0.9% 1000 Ml)  1,000 mls @ 125 mls/hr IV AS DIRECT [facility]


   Last Admin: 08/11/17 22:55 Dose:  125 mls/hr


Piperacillin Sod/Tazobactam Sod (Zosyn/Ns 4.5gm/100ml)  4.5 gm in 100 mls @ 200 

mls/hr IV Q8HR [facility]


   PRN Reason: Protocol


Insulin Aspart (Novolog)  0 units SUB-Q ACHS [facility]


   PRN Reason: Protocol


   Last Admin: 08/12/17 12:22 Dose:  6 units


Insulin Human Isoph/Insulin Regular (Novolin 70/30)  25 unit SUB-Q QAMDIAB JESSY


Insulin Human Isoph/Insulin Regular (Novolin 70/30)  15 unit SUB-Q QPM JESSY


Levothyroxine Sodium (Synthroid)  50 mcg PO 0600 [facility]


   Last Admin: 08/12/17 06:05 Dose:  50 mcg


Lisinopril (Zestril)  20 mg PO QDAY [facility]


   Last Admin: 08/12/17 12:27 Dose:  20 mg


Magnesium Hydroxide (Milk Of Magnesia)  30 ml PO Q4H PRN


   PRN Reason: Constipation


Miscellaneous Medication (Paroxetine Cr (Nf))  12.5 mg PO QAM [facility]


Ondansetron HCl (Zofran)  4 mg IV Q8H PRN


   PRN Reason: N/V unrelieved by Reglan


   Last Admin: 08/11/17 21:34 Dose:  4 mg


Sodium Chloride (Sodium Chloride Flush Syringe 10 Ml)  10 ml IV PRN NR


   Stop: 08/14/17 13:59


Vancomycin HCl (Vancomycin Pharmacy To Dose)  1 each IV PKCONSULT [facility]


   PRN Reason: Protocol











Physical Examination


Vital signs: 


 Vital Signs











Temp Pulse Resp BP Pulse Ox


 


 97.9 F   125 H  18   107/55   98 


 


 08/11/17 10:38  08/11/17 10:38  08/11/17 10:38  08/11/17 10:38  08/11/17 10:38














Results





- Laboratory Findings


CBC and BMP: 


 08/11/17 13:00





 08/12/17 12:59


Abnormal lab findings: 


 Abnormal Labs











  08/11/17 08/11/17 08/11/17





  16:18 16:18 16:39


 


VBG pH  7.284 L  


 


Sodium   126 L D 


 


Chloride   87.1 L 


 


Carbon Dioxide   21 L 


 


BUN   31 H 


 


Creatinine   


 


Glucose   410 H 


 


POC Glucose    334 H


 


Calcium   8.0 L 


 


Phosphorus   


 


Urine WBC (Auto)   














  08/11/17 08/11/17 08/11/17





  18:02 19:08 19:38


 


VBG pH   


 


Sodium    130 L


 


Chloride    94.1 L


 


Carbon Dioxide   


 


BUN    34 H


 


Creatinine    1.3 H


 


Glucose    223 H


 


POC Glucose  364 H  313 H 


 


Calcium    7.9 L


 


Phosphorus   


 


Urine WBC (Auto)   














  08/11/17 08/11/17 08/11/17





  20:07 21:00 21:47


 


VBG pH   


 


Sodium   


 


Chloride   


 


Carbon Dioxide   


 


BUN   


 


Creatinine   


 


Glucose   


 


POC Glucose  266 H  171 H  132 H


 


Calcium   


 


Phosphorus   


 


Urine WBC (Auto)   














  08/11/17 08/11/17 08/11/17





  22:30 23:49 Unknown


 


VBG pH   


 


Sodium  129 L  129 L 


 


Chloride  94.2 L  93.5 L 


 


Carbon Dioxide   


 


BUN  34 H  34 H 


 


Creatinine   


 


Glucose   104 H 


 


POC Glucose   


 


Calcium  8.0 L  7.8 L 


 


Phosphorus  2.10 L  


 


Urine WBC (Auto)    > 182.0 H














  08/12/17 08/12/17 08/12/17





  01:11 01:19 02:00


 


VBG pH   


 


Sodium  124 L  


 


Chloride  92.3 L  


 


Carbon Dioxide  16 L  


 


BUN  34 H  


 


Creatinine   


 


Glucose  103 H  


 


POC Glucose   124 H  111 H


 


Calcium  7.9 L  


 


Phosphorus   


 


Urine WBC (Auto)   














  08/12/17 08/12/17 08/12/17





  02:25 03:06 04:17


 


VBG pH   


 


Sodium  128 L  


 


Chloride  94.0 L  


 


Carbon Dioxide  19 L  


 


BUN  34 H  


 


Creatinine   


 


Glucose  136 H  


 


POC Glucose   164 H  181 H


 


Calcium  8.1 L  


 


Phosphorus   


 


Urine WBC (Auto)   














  08/12/17 08/12/17 08/12/17





  04:53 05:32 05:33


 


VBG pH   


 


Sodium    129 L


 


Chloride    95.3 L


 


Carbon Dioxide    17 L


 


BUN    36 H


 


Creatinine   


 


Glucose    155 H


 


POC Glucose  184 H  171 H 


 


Calcium    7.7 L


 


Phosphorus   


 


Urine WBC (Auto)   














  08/12/17 08/12/17 08/12/17





  07:56 08:20 09:04


 


VBG pH   


 


Sodium   129 L 


 


Chloride   96.0 L 


 


Carbon Dioxide   18 L 


 


BUN   33 H 


 


Creatinine   


 


Glucose   124 H 


 


POC Glucose  131 H   126 H


 


Calcium   7.9 L 


 


Phosphorus   


 


Urine WBC (Auto)   














  08/12/17 08/12/17





  09:58 12:13


 


VBG pH  


 


Sodium  


 


Chloride  


 


Carbon Dioxide  


 


BUN  


 


Creatinine  


 


Glucose  


 


POC Glucose  142 H  295 H


 


Calcium  


 


Phosphorus  


 


Urine WBC (Auto)

## 2017-08-12 NOTE — CONSULTATION
History of Present Illness





- Reason for Consult


Consult date: 08/12/17


rt flank,rt axcillary,labia, lt thigh abcess


Requesting physician: MONICA CASTNAO





- History of Present Illness





Ms. Lorenz is a 57-year-old woman admitted with DKA who is also found to have 

multiple abscesses throughout. She has a right axillary lesion which was first 

noticed 4 days ago. She had a right labial lesion, which has been present "for 

more than 10 years." She also had pain along her right flank with associated 

redness. The pain was present before a fall during this admission. ID 

consultation is requested for further treatment recommendations.





Past History


Past Medical History: diabetes, hypertension, hypothyroidism


Past Surgical History: Other (Spinal Fusion)





Medications and Allergies


 Allergies











Allergy/AdvReac Type Severity Reaction Status Date / Time


 


codeine Allergy  Rash Verified 08/11/17 10:49











 Home Medications











 Medication  Instructions  Recorded  Confirmed  Last Taken  Type


 


Escitalopram [Lexapro] 10 mg PO DAILY 08/11/17 08/11/17 08/11/17 History


 


Levothyroxine [Synthroid] 50 mcg PO QAM 08/11/17 08/11/17 08/11/17 History


 


Lisinopril [Zestril] 20 mg PO QDAY 08/11/17 08/11/17 08/11/17 History


 


PARoxetine CR (NF) [Paxil (Nf)] 12.5 mg PO QAM 08/11/17 08/11/17 08/11/17 

History


 


Rosuvastatin (Nf) [Crestor] 20 mg PO QHS 08/11/17 08/11/17 08/11/17 History











Active Meds: 


Active Medications





Acetaminophen (Tylenol)  650 mg PO Q4H PRN


   PRN Reason: Pain MILD(1-3)/Fever >100.5/HA


Acetaminophen/Hydrocodone Bitart (Norco 5/325)  1 each PO Q4H PRN


   PRN Reason: Pain, Moderate (4-6)


Atorvastatin Calcium (Lipitor)  40 mg PO QHS JESSY


   Last Admin: 08/11/17 21:35 Dose:  40 mg


Bisacodyl (Dulcolax)  10 mg [place] QDAY PRN


   PRN Reason: Constipation unrelieved by MOM


Dextrose (D50w (25gm))  0 ml IV PRN PRN


   PRN Reason: Hypoglycemia


Dextrose (D50w (25gm))  0 gm IV PRN PRN


   PRN Reason: Hypoglycemia


   Last Admin: 08/11/17 23:55 Dose:  10 gm


Enoxaparin Sodium (Lovenox)  40 mg SUB-Q QDAY@1000 JESSY


Escitalopram Oxalate (Lexapro)  10 mg PO DAILY [facility]


   Last Admin: 08/12/17 12:28 Dose:  10 mg


Famotidine (Pepcid)  20 mg PO QDAY [facility]


Hydromorphone HCl (Dilaudid)  0.5 mg IV Q3H PRN


   PRN Reason: Pain , Severe (7-10)


   Last Admin: 08/11/17 21:37 Dose:  0.5 mg


Sodium Chloride (Nacl 0.9% 1000 Ml)  1,000 mls @ 125 mls/hr IV AS DIRECT [facility]


   Last Admin: 08/11/17 22:55 Dose:  125 mls/hr


Piperacillin Sod/Tazobactam Sod (Zosyn/Ns 4.5gm/100ml)  4.5 gm in 100 mls @ 200 

mls/hr IV Q8HR [facility]


   PRN Reason: Protocol


   Last Admin: 08/12/17 17:16 Dose:  200 mls/hr


Vancomycin HCl 1,250 mg/ (Sodium Chloride)  262.5 mls @ 166.667 mls/hr IV Q24H 

[facility]


   Last Admin: 08/12/17 17:19 Dose:  166.667 mls/hr


Sodium Chloride (Nacl 0.9% 1000 Ml)  1,000 mls @ 75 mls/hr IV AS DIRECT [facility]


   Last Admin: 08/12/17 15:24 Dose:  75 mls/hr


Insulin Aspart (Novolog)  0 units SUB-Q ACHS [facility]


   PRN Reason: Protocol


   Last Admin: 08/12/17 17:12 Dose:  10 units


Insulin Human Isoph/Insulin Regular (Novolin 70/30)  25 unit SUB-Q QAMDIAB [facility]


Insulin Human Isoph/Insulin Regular (Novolin 70/30)  15 unit SUB-Q QPM [facility]


   Last Admin: 08/12/17 17:13 Dose:  15 unit


Levothyroxine Sodium (Synthroid)  50 mcg PO 0600 [facility]


   Last Admin: 08/12/17 06:05 Dose:  50 mcg


Lisinopril (Zestril)  20 mg PO QDAY [facility]


   Last Admin: 08/12/17 12:27 Dose:  20 mg


Magnesium Hydroxide (Milk Of Magnesia)  30 ml PO Q4H PRN


   PRN Reason: Constipation


Ondansetron HCl (Zofran)  4 mg IV Q8H PRN


   PRN Reason: N/V unrelieved by Reglan


   Last Admin: 08/11/17 21:34 Dose:  4 mg


Paroxetine HCl (Paxil (Nf))  12.5 mg PO QDAY [facility]


   Last Admin: 08/12/17 14:07 Dose:  12.5 mg


Sodium Chloride (Sodium Chloride Flush Syringe 10 Ml)  10 ml IV PRN NR


   Stop: 08/14/17 13:59


Vancomycin HCl (Vancomycin Pharmacy To Dose)  1 each IV PKCONSULT JESSY


   PRN Reason: Protocol











Review of Systems


All systems: negative


Constitutional: no fever, no chills, no sweats


Cardiovascular: no chest pain


Gastrointestinal: no abdominal pain, no nausea, no vomiting, no diarrhea


Genitourinary Female: no dysuria


Integumentary: redness, boils, no pruritis





Physical Examination





- Constitutional


Vitals: 


 Vital Signs











Temp Pulse Resp BP Pulse Ox


 


 99.7 F H  108 H  27 H  124/70   98 


 


 08/12/17 16:11  08/12/17 15:57  08/12/17 15:57  08/12/17 12:27  08/12/17 15:57








 Temperature -Last 24 Hours











Temperature                    99.7 F


 


Temperature                    98.8 F


 


Temperature                    99.1 F


 


Temperature                    98.8 F


 


Temperature                    99.3 F


 


Temperature                    98.9 F














General appearance: Present: no acute distress, other (non-toxic appearance)





- Respiratory


Respiratory effort: normal


Respiratory: bilateral: CTA





- Cardiovascular


Rhythm: regular


Heart Sounds: Present: S1 & S2





- Extremities


Extremities: No edema


Extremity abnormal: other (furuncles at right axilla and left thigh)





- Abdominal


General gastrointestinal: Present: soft, non-tender, non-distended


Female genitourinary: Present: other (small furuncle at right labium)





- Integumentary


Integumentary: Present: erythema (erythema along right flank without vesicles 

in a dermatomal distribution).  Absent: rash





- Psychiatric


Psychiatric: appropriate mood/affect





- Neurologic


Neurologic: moves all extremities





Results





- Labs


CBC & Chem 7: 


 08/11/17 13:00





 08/12/17 12:59


Labs: 


 Abnormal lab results











  08/11/17 08/11/17 08/11/17 Range/Units





  18:02 19:08 19:38 


 


Sodium    130 L  (137-145)  mmol/L


 


Chloride    94.1 L  ()  mmol/L


 


Carbon Dioxide     (22-30)  mmol/L


 


BUN    34 H  (7-17)  mg/dL


 


Creatinine    1.3 H  (0.7-1.2)  mg/dL


 


Glucose    223 H  ()  mg/dL


 


POC Glucose  364 H  313 H   ()  


 


Lactic Acid     (0.7-2.0)  mmol/L


 


Calcium    7.9 L  (8.4-10.2)  mg/dL


 


Phosphorus     (2.5-4.5)  mg/dL


 


C-Reactive Protein     (0.00-1.30)  mg/dL














  08/11/17 08/11/17 08/11/17 Range/Units





  20:07 21:00 21:47 


 


Sodium     (137-145)  mmol/L


 


Chloride     ()  mmol/L


 


Carbon Dioxide     (22-30)  mmol/L


 


BUN     (7-17)  mg/dL


 


Creatinine     (0.7-1.2)  mg/dL


 


Glucose     ()  mg/dL


 


POC Glucose  266 H  171 H  132 H  ()  


 


Lactic Acid     (0.7-2.0)  mmol/L


 


Calcium     (8.4-10.2)  mg/dL


 


Phosphorus     (2.5-4.5)  mg/dL


 


C-Reactive Protein     (0.00-1.30)  mg/dL














  08/11/17 08/11/17 08/12/17 Range/Units





  22:30 23:49 01:11 


 


Sodium  129 L  129 L  124 L  (137-145)  mmol/L


 


Chloride  94.2 L  93.5 L  92.3 L  ()  mmol/L


 


Carbon Dioxide    16 L  (22-30)  mmol/L


 


BUN  34 H  34 H  34 H  (7-17)  mg/dL


 


Creatinine     (0.7-1.2)  mg/dL


 


Glucose   104 H  103 H  ()  mg/dL


 


POC Glucose     ()  


 


Lactic Acid     (0.7-2.0)  mmol/L


 


Calcium  8.0 L  7.8 L  7.9 L  (8.4-10.2)  mg/dL


 


Phosphorus  2.10 L    (2.5-4.5)  mg/dL


 


C-Reactive Protein     (0.00-1.30)  mg/dL














  08/12/17 08/12/17 08/12/17 Range/Units





  01:19 02:00 02:25 


 


Sodium    128 L  (137-145)  mmol/L


 


Chloride    94.0 L  ()  mmol/L


 


Carbon Dioxide    19 L  (22-30)  mmol/L


 


BUN    34 H  (7-17)  mg/dL


 


Creatinine     (0.7-1.2)  mg/dL


 


Glucose    136 H  ()  mg/dL


 


POC Glucose  124 H  111 H   ()  


 


Lactic Acid     (0.7-2.0)  mmol/L


 


Calcium    8.1 L  (8.4-10.2)  mg/dL


 


Phosphorus     (2.5-4.5)  mg/dL


 


C-Reactive Protein     (0.00-1.30)  mg/dL














  08/12/17 08/12/17 08/12/17 Range/Units





  03:06 04:17 04:53 


 


Sodium     (137-145)  mmol/L


 


Chloride     ()  mmol/L


 


Carbon Dioxide     (22-30)  mmol/L


 


BUN     (7-17)  mg/dL


 


Creatinine     (0.7-1.2)  mg/dL


 


Glucose     ()  mg/dL


 


POC Glucose  164 H  181 H  184 H  ()  


 


Lactic Acid     (0.7-2.0)  mmol/L


 


Calcium     (8.4-10.2)  mg/dL


 


Phosphorus     (2.5-4.5)  mg/dL


 


C-Reactive Protein     (0.00-1.30)  mg/dL














  08/12/17 08/12/17 08/12/17 Range/Units





  05:32 05:33 07:56 


 


Sodium   129 L   (137-145)  mmol/L


 


Chloride   95.3 L   ()  mmol/L


 


Carbon Dioxide   17 L   (22-30)  mmol/L


 


BUN   36 H   (7-17)  mg/dL


 


Creatinine     (0.7-1.2)  mg/dL


 


Glucose   155 H   ()  mg/dL


 


POC Glucose  171 H   131 H  ()  


 


Lactic Acid     (0.7-2.0)  mmol/L


 


Calcium   7.7 L   (8.4-10.2)  mg/dL


 


Phosphorus     (2.5-4.5)  mg/dL


 


C-Reactive Protein     (0.00-1.30)  mg/dL














  08/12/17 08/12/17 08/12/17 Range/Units





  08:20 09:04 09:58 


 


Sodium  129 L    (137-145)  mmol/L


 


Chloride  96.0 L    ()  mmol/L


 


Carbon Dioxide  18 L    (22-30)  mmol/L


 


BUN  33 H    (7-17)  mg/dL


 


Creatinine     (0.7-1.2)  mg/dL


 


Glucose  124 H    ()  mg/dL


 


POC Glucose   126 H  142 H  ()  


 


Lactic Acid     (0.7-2.0)  mmol/L


 


Calcium  7.9 L    (8.4-10.2)  mg/dL


 


Phosphorus     (2.5-4.5)  mg/dL


 


C-Reactive Protein     (0.00-1.30)  mg/dL














  08/12/17 08/12/17 08/12/17 Range/Units





  12:13 12:59 14:43 


 


Sodium   128 L   (137-145)  mmol/L


 


Chloride   94.6 L   ()  mmol/L


 


Carbon Dioxide   17 L   (22-30)  mmol/L


 


BUN   32 H   (7-17)  mg/dL


 


Creatinine     (0.7-1.2)  mg/dL


 


Glucose   292 H   ()  mg/dL


 


POC Glucose  295 H    ()  


 


Lactic Acid    2.90 H*  (0.7-2.0)  mmol/L


 


Calcium   8.0 L   (8.4-10.2)  mg/dL


 


Phosphorus     (2.5-4.5)  mg/dL


 


C-Reactive Protein     (0.00-1.30)  mg/dL














  08/12/17 08/12/17 Range/Units





  14:43 15:57 


 


Sodium    (137-145)  mmol/L


 


Chloride    ()  mmol/L


 


Carbon Dioxide    (22-30)  mmol/L


 


BUN    (7-17)  mg/dL


 


Creatinine    (0.7-1.2)  mg/dL


 


Glucose    ()  mg/dL


 


POC Glucose   431 H  ()  


 


Lactic Acid    (0.7-2.0)  mmol/L


 


Calcium    (8.4-10.2)  mg/dL


 


Phosphorus    (2.5-4.5)  mg/dL


 


C-Reactive Protein  34.10 H   (0.00-1.30)  mg/dL








 Microbiology





08/11/17 13:04   Peripheral/Venous   Blood Culture - Preliminary


                            NO GROWTH AFTER 24 HOURS


08/11/17 13:00   Peripheral/Venous   Blood Culture - Preliminary


                            NO GROWTH AFTER 24 HOURS











- Imaging and Cardiology


Chest x-ray: report reviewed (no acute cardiopulmonary findings)





Assessment and Plan





- Patient Problems


(1) Abscess


Current Visit: Yes   Status: Acute   


Plan to address problem: 


   1. Multiple boils with patient likely predisposed due to uncontrolled 

diabetes mellitus.


    2. Will screen for HIV infection.


    3. Continue Vancomycin IV monotherapy with transition to oral Bactrim or 

Clindamycin when patient is ready for discharge.








(2) Herpes zoster


Current Visit: Yes   Status: Acute   


Qualifiers: 


   Herpes zoster complications: H   Herpes zoster neurologic complication detail

: H   Herpes zoster ocular complication detail: H 


Plan to address problem: 


   1. Apparent herpes zoster sine herpete.


    2. Will start Acyclovir to complete a 7-day course. Oral Acyclovir when 

patient is ready for discharge.


    3. Keep area covered. No other isolation is required.

## 2017-08-12 NOTE — CONSULTATION
PULMONARY CRITICAL CARE CONSULT NOTE



CONSULTING PHYSICIAN:  Dr. Eric and Dr. Friend.



REASON FOR CONSULTATION:  Diabetic ketoacidosis, need for IV insulin

administration and ICU admission.



CHIEF COMPLAINT AND HISTORY OF PRESENT ILLNESS:  As follows:  The patient is a

57-year-old  female with past medical history indeed significant for

longstanding diabetes.  She stated she has been taking her medications, who came

to the Emergency Room a couple of days prior due to abscess/boil under her right

armpit.  She was unable to get in.  When she did come back yesterday, she had

significant erythema with tenderness, unlikely element of cellulitis involving

the right side of her flank, essentially going all the way down from her right

armpit.  She also was found to be hyperglycemic.  She was diagnosed with

diabetic ketoacidosis and ICU admission was requested for IV insulin

administration.  When I stopped by to see her, she was just transitioned and off

the IV insulin, feeling a little bit better, still complaining of some pain in

the right side.  No nausea or vomiting today.  No fevers or chills.  She has a

20+ pack-year tobacco smoking history and continues to smoke.  That really is as

much of the history of presentation as I have.



PAST MEDICAL HISTORY:  Hypertension, diabetes, arthritis, asthma, and

hypothyroidism.  She is obese.



PAST SURGICAL HISTORY:  She has had lower back spine fusion surgery.



MEDICATIONS:  She was on at the time I stopped by to see her, according to the

medication administration record included the following:  Tylenol 650 mg p.o. q.

4 hours p.r.n. mild pain, Norco 5/325 one tablet p.o. q. 4 hours p.r.n. moderate

pain, Lipitor 40 mg p.o. q. h.s. p.r.n., Dulcolax, Lexapro 10 mg p.o. daily,

Dilaudid 0.5 mg IV q. 3 hours p.r.n. severe pain, insulin via sliding scale

70/30 insulin 15 units subcu q.p.m. and 25 units subq q.a.m. has been started,

Levoxyl 50 mcg p.o. daily, lisinopril 20 mg p.o. daily, Zofran 4 mg IV q. 8

hours p.r.n. nausea and vomiting, Zosyn 4.5 grams IV q. 8 hours, Paxil 12.5 mg

p.o. daily, vancomycin 1.25 grams IV q. 24 hours.



ALLERGIES:  CODEINE, nature of this allergy is unknown.



DIET:  Obese lady, denies acute weight loss or gain in the preceding few weeks

to months.



FAMILY AND SOCIAL HISTORY:  Lives in the community, 20+ pack-year tobacco

smoking history.  Denies alcohol or illicit drug use or abuse.  Family history,

otherwise is positive for diabetes.



REVIEW OF SYSTEMS:  No loss of consciousness.  No new onset seizures.  No new

onset focal weakness.  No gross hematochezia or melena.  No gross hematuria or

dysuria.  No hematemesis.  No hemoptysis.  Complete 13-system review of systems

obtained.  Pertinent positives and/or negatives as in body of history above,

otherwise they are noncontributory.



PHYSICAL EXAMINATION:

VITAL SIGNS:  At presentation, she was afebrile, temperature 97.9, pulse was

125, respiratory rate was 18, blood pressure 107/55, oxygen sats were 98%,

inspired oxygen concentration was not recorded.

HEAD, EYES, EARS, NOSE AND THROAT:  Pupils are equal, round, 3-4 mm, reactive to

light.  Extraocular muscle movements are intact.  Oropharynx is a Mallampati #2

oropharynx.  No significant posterior oropharyngeal erythema.

LUNGS:  Auscultation of both lung fields, lungs are clear bilaterally, slightly

prolonged expiratory phase.  No wheezing.

HEART:  Heart sounds 1 and 2 are heard.  They were regular in rate and rhythm at

time of my evaluation.

ABDOMEN:  Soft, full, bowel sounds are positive, nontender.

EXTREMITIES:  Without overt digital clubbing, cyanosis, or pedal edema.  She is

tender in the right axillary region and around the right lateral chest and

flank, but no obvious fluctuance or suggestion of abscess pocket.

NEUROLOGIC:  The exam was nonfocal.



LABORATORY DATA:  From my review are as follows:  Admission white cell count

19,800, hemoglobin 12.3, hematocrit 37.3, platelet count 256.  Venous blood gas

at presentation 7.28.  Serum sodium 118, potassium 4.6, chloride 79, bicarbonate

22, BUN 32, creatinine 1.3, glucose 659, lactic acid was 3.1; otherwise, liver

function tests within normal limits.  Urinalysis showed large leukocyte esterase

with greater than 182 white cells per high power field.  I should mention her

admission venous blood gas, the pH was within normal limits.  Microbiology: 

Blood cultures, no growth to date.  Chest x-ray was done.  I am pulling up the

image.  I have reviewed the radiologist's interpretation.  He describes it as no

acute cardiopulmonary findings.



ASSESSMENT AND PLAN:  We have a middle-aged lady in with uncontrolled diabetes

with hyperglycemia and presumably related to the right axillary abscess and

cellulitis.  From a respiratory standpoint, she is doing relatively well, oxygen

will be given as necessary to keep sats greater than or equal to about 92-94%. 

Aspiration precautions will be maintained.  Tobacco cessation has been

counseled.  We will follow her clinically otherwise.  Bronchodilators will be on

a p.r.n. basis.  From a cardiovascular standpoint, blood pressure is fine.  She

is actually doing well cardiac wise.  We will follow her clinically.  From a GI

and nutritional standpoint, oral nutrition will now be introduced after a

swallow evaluation is done.  She will be placed on GI prophylaxis and aspiration

precautions will be maintained.  From a renal standpoint, electrolytes are being

followed and corrected.  Hyponatremia is improving, certainly some of that

related to the hyperglycemia.  Inputs and outputs will be monitored. 

Electrolytes will be corrected as necessary.  The phosphorus level will also be

corrected.  From infectious disease standpoint, we will continue her on Zosyn

and vancomycin at this point.  I do feel that we might be dealing mainly with a

UTI and we should be able to deescalate the anti-infectives once we get culture

results and sensitivities.  Again, anti-infectives will be deescalated based on

results of clinical and microbiologic data.  From a CNS standpoint, the exam is

grossly nonfocal.  No acute indication for neuro imaging.  We will follow her

clinically.  From a general and hospital healthcare maintenance standpoint, I am

going to put her on GI prophylaxis now.  She is on DVT prophylaxis in the form

of SCDs.  I will be ordering some Lovenox.  Flu and pneumonia vaccination will

be per protocol.



Thank you very much for the consult, Dr. Eric.  We will follow along and

make further recommendations as picture progresses/becomes clearer.  She is

doing better and can be transferred out of the Intensive Care Unit.





DD: 08/12/2017 13:58

DT: 08/12/2017 15:09

JOB# [id number]  [id number]

AJM/DEVON

## 2017-08-13 LAB
ANION GAP SERPL CALC-SCNC: 20 MMOL/L
BACTERIA #/AREA URNS HPF: (no result) /HPF
BASOPHILS NFR BLD AUTO: 0.2 % (ref 0–1.8)
BILIRUB UR QL STRIP: (no result)
BLOOD UR QL VISUAL: (no result)
BUN SERPL-MCNC: 31 MG/DL (ref 7–17)
BUN/CREAT SERPL: 28.18 %
CALCIUM SERPL-MCNC: 8.2 MG/DL (ref 8.4–10.2)
CHLORIDE SERPL-SCNC: 94.4 MMOL/L (ref 98–107)
CO2 SERPL-SCNC: 20 MMOL/L (ref 22–30)
EOSINOPHIL NFR BLD AUTO: 1.2 % (ref 0–4.3)
GLUCOSE SERPL-MCNC: 277 MG/DL (ref 65–100)
HCT VFR BLD CALC: 32.5 % (ref 30.3–42.9)
HGB BLD-MCNC: 10.5 GM/DL (ref 10.1–14.3)
HIV-1 ANTIGEN P24: (no result)
HIVR-1/2 AB: (no result)
KETONES UR STRIP-MCNC: (no result) MG/DL
LEUKOCYTE ESTERASE UR QL STRIP: (no result)
MCH RBC QN AUTO: 28 PG (ref 28–32)
MCHC RBC AUTO-ENTMCNC: 33 % (ref 30–34)
MCV RBC AUTO: 86 FL (ref 79–97)
MUCOUS THREADS #/AREA URNS HPF: (no result) /HPF
NITRITE UR QL STRIP: (no result)
PH UR STRIP: 6 [PH] (ref 5–7)
PLATELET # BLD: 245 K/MM3 (ref 140–440)
POTASSIUM SERPL-SCNC: 4.3 MMOL/L (ref 3.6–5)
RBC # BLD AUTO: 3.76 M/MM3 (ref 3.65–5.03)
RBC #/AREA URNS HPF: 2 /HPF (ref 0–6)
SODIUM SERPL-SCNC: 130 MMOL/L (ref 137–145)
SODIUM UR-SCNC: 11 MEQ/L
UROBILINOGEN UR-MCNC: 4 MG/DL (ref ?–2)
WBC # BLD AUTO: 15.4 K/MM3 (ref 4.5–11)
WBC #/AREA URNS HPF: 106 /HPF (ref 0–6)

## 2017-08-13 RX ADMIN — VANCOMYCIN HYDROCHLORIDE SCH MLS/HR: 100 INJECTION, POWDER, LYOPHILIZED, FOR SOLUTION INTRAVENOUS at 14:15

## 2017-08-13 RX ADMIN — SODIUM CHLORIDE SCH MLS/HR: 0.9 INJECTION, SOLUTION INTRAVENOUS at 05:38

## 2017-08-13 RX ADMIN — ACYCLOVIR SODIUM SCH MLS/HR: 500 INJECTION, SOLUTION INTRAVENOUS at 05:36

## 2017-08-13 RX ADMIN — ACYCLOVIR SODIUM SCH MLS/HR: 500 INJECTION, SOLUTION INTRAVENOUS at 15:30

## 2017-08-13 RX ADMIN — INSULIN ASPART SCH UNITS: 100 INJECTION, SOLUTION INTRAVENOUS; SUBCUTANEOUS at 11:57

## 2017-08-13 RX ADMIN — LEVOFLOXACIN SCH MG: 500 TABLET, FILM COATED ORAL at 17:20

## 2017-08-13 RX ADMIN — INSULIN ASPART SCH UNITS: 100 INJECTION, SOLUTION INTRAVENOUS; SUBCUTANEOUS at 22:25

## 2017-08-13 RX ADMIN — ESCITALOPRAM OXALATE SCH MG: 10 TABLET ORAL at 09:04

## 2017-08-13 RX ADMIN — PAROXETINE HYDROCHLORIDE SCH MG: 12.5 TABLET, FILM COATED, EXTENDED RELEASE ORAL at 09:05

## 2017-08-13 RX ADMIN — SODIUM CHLORIDE SCH MLS/HR: 0.9 INJECTION, SOLUTION INTRAVENOUS at 12:39

## 2017-08-13 RX ADMIN — INSULIN DETEMIR SCH UNITS: 100 INJECTION, SOLUTION SUBCUTANEOUS at 22:25

## 2017-08-13 RX ADMIN — INSULIN ASPART SCH UNITS: 100 INJECTION, SOLUTION INTRAVENOUS; SUBCUTANEOUS at 17:20

## 2017-08-13 RX ADMIN — LEVOTHYROXINE SODIUM SCH MCG: 0.05 TABLET ORAL at 05:36

## 2017-08-13 RX ADMIN — HYDROMORPHONE HYDROCHLORIDE PRN MG: 1 INJECTION, SOLUTION INTRAMUSCULAR; INTRAVENOUS; SUBCUTANEOUS at 20:38

## 2017-08-13 RX ADMIN — ACYCLOVIR SODIUM SCH MLS/HR: 500 INJECTION, SOLUTION INTRAVENOUS at 22:24

## 2017-08-13 RX ADMIN — INSULIN ASPART SCH UNITS: 100 INJECTION, SOLUTION INTRAVENOUS; SUBCUTANEOUS at 08:18

## 2017-08-13 RX ADMIN — FAMOTIDINE SCH MG: 20 TABLET ORAL at 09:05

## 2017-08-13 RX ADMIN — ENOXAPARIN SODIUM SCH MG: 100 INJECTION SUBCUTANEOUS at 09:04

## 2017-08-13 RX ADMIN — LISINOPRIL SCH: 20 TABLET ORAL at 09:05

## 2017-08-13 RX ADMIN — HYDROMORPHONE HYDROCHLORIDE PRN MG: 1 INJECTION, SOLUTION INTRAMUSCULAR; INTRAVENOUS; SUBCUTANEOUS at 12:44

## 2017-08-13 RX ADMIN — HYDROCODONE BITARTRATE AND ACETAMINOPHEN PRN EACH: 5; 325 TABLET ORAL at 05:37

## 2017-08-13 RX ADMIN — ACETAMINOPHEN PRN MG: 325 TABLET ORAL at 12:43

## 2017-08-13 NOTE — PROGRESS NOTE
Assessment and Plan





- Patient Problems


(1) Cellulitis


Current Visit: Yes   Status: Acute   


Qualifiers: 


   Site of cellulitis: S   Site of cellulitis of extremity: S   Site of 

cellulitis of trunk: S   Laterality: L 


Plan to address problem: 





- continue empiric AB's


- seen by ID


- 








(2) Renal insufficiency


Current Visit: Yes   Status: Acute   





(3) Sepsis


Current Visit: Yes   Status: Acute   


Qualifiers: 


   Sepsis type: S 


Plan to address problem: 





- will aggresively volume resuscitate


- wean off levophed for MAP > 60mmHg


- continue to treat sepsis


- trend lactate and CRP prn








(4) DKA (diabetic ketoacidoses)


Current Visit: Yes   Status: Acute   


Qualifiers: 


   Diabetes mellitus type: type 2   Diabetes mellitus complication detail: D 


Plan to address problem: 





- improved


- started on long acting insulin


- continue SSI








(5) UTI (urinary tract infection)


Current Visit: Yes   Status: Acute   


Qualifiers: 


   Urinary tract infection type: acute cystitis   Hematuria presence: H   

Indwelling urinary catheter type: I   Encounter type: E 


Plan to address problem: 





- growing gram negative rods


- will start empiric levaquin as zosyn stopped


- follow sensitivities








(6) Discharge planning issues


Current Visit: Yes   Status: Acute   


Plan to address problem: 





- remains critically ill on levophed and at risk for further deterioration





...30' CCT








Subjective


Date of service: 08/13/17


Principal diagnosis: Sepsis Syndrome; Cellulitis


Interval history: 





Seen and examined at bedside; 24 hour events reviewed; nursing and respiratory 

care staff consulted; no adverse overnight events reported to me; resting 

peacefully in bed; denies acute chest pains or increased SOB; still with pain 

in her back; no emesis or overt aspiration





Objective


 Vital Signs - 12hr











  08/13/17 08/13/17 08/13/17





  04:00 05:37 08:00


 


Temperature 98.7 F  98.1 F


 


Pulse Rate   


 


Respiratory  18 





Rate   


 


Blood Pressure   














  08/13/17 08/13/17 08/13/17





  09:05 10:00 12:43


 


Temperature   


 


Pulse Rate 90 98 H 


 


Respiratory   13





Rate   


 


Blood Pressure 103/59  














  08/13/17





  12:44


 


Temperature 


 


Pulse Rate 


 


Respiratory 13





Rate 


 


Blood Pressure 











Constitutional: no acute distress, alert


Eyes: non-icteric


ENT: oropharynx moist


Neck: supple, no lymphadenopathy


Effort: mildly labored


Ascultation: Bilateral: clear


Cardiovascular: regular rate and rhythm


Gastrointestinal: normoactive bowel sounds, soft, non-tender, non-distended


Integumentary: cellulitis, other (erythema to right upper back and axillary 

region; no graining lesion)


Extremities: no cyanosis, no edema, pulses normal, no ischemia or petechiae


Neurologic: normal mental status, non-focal exam, pupils equal and round, motor 

strength normal and


Psychiatric: mood appropriate, affect normal


CBC and BMP: 


 08/13/17 11:34





 08/13/17 03:51


Abnormal lab findings: 


 Abnormal Labs











  08/11/17 08/11/17 08/11/17





  16:18 16:18 16:39


 


WBC   


 


Lymph % (Auto)   


 


Lymph #   


 


Seg Neutrophils %   


 


Seg Neutrophils #   


 


VBG pH  7.284 L  


 


Sodium   126 L D 


 


Chloride   87.1 L 


 


Carbon Dioxide   21 L 


 


BUN   31 H 


 


Creatinine   


 


Glucose   410 H 


 


POC Glucose    334 H


 


Lactic Acid   


 


Calcium   8.0 L 


 


Phosphorus   


 


C-Reactive Protein   


 


Urine WBC (Auto)   














  08/11/17 08/11/17 08/11/17





  18:02 19:08 19:38


 


WBC   


 


Lymph % (Auto)   


 


Lymph #   


 


Seg Neutrophils %   


 


Seg Neutrophils #   


 


VBG pH   


 


Sodium    130 L


 


Chloride    94.1 L


 


Carbon Dioxide   


 


BUN    34 H


 


Creatinine    1.3 H


 


Glucose    223 H


 


POC Glucose  364 H  313 H 


 


Lactic Acid   


 


Calcium    7.9 L


 


Phosphorus   


 


C-Reactive Protein   


 


Urine WBC (Auto)   














  08/11/17 08/11/17 08/11/17





  20:07 21:00 21:47


 


WBC   


 


Lymph % (Auto)   


 


Lymph #   


 


Seg Neutrophils %   


 


Seg Neutrophils #   


 


VBG pH   


 


Sodium   


 


Chloride   


 


Carbon Dioxide   


 


BUN   


 


Creatinine   


 


Glucose   


 


POC Glucose  266 H  171 H  132 H


 


Lactic Acid   


 


Calcium   


 


Phosphorus   


 


C-Reactive Protein   


 


Urine WBC (Auto)   














  08/11/17 08/11/17 08/11/17





  22:30 23:49 Unknown


 


WBC   


 


Lymph % (Auto)   


 


Lymph #   


 


Seg Neutrophils %   


 


Seg Neutrophils #   


 


VBG pH   


 


Sodium  129 L  129 L 


 


Chloride  94.2 L  93.5 L 


 


Carbon Dioxide   


 


BUN  34 H  34 H 


 


Creatinine   


 


Glucose   104 H 


 


POC Glucose   


 


Lactic Acid   


 


Calcium  8.0 L  7.8 L 


 


Phosphorus  2.10 L  


 


C-Reactive Protein   


 


Urine WBC (Auto)    > 182.0 H














  08/12/17 08/12/17 08/12/17





  01:11 01:19 02:00


 


WBC   


 


Lymph % (Auto)   


 


Lymph #   


 


Seg Neutrophils %   


 


Seg Neutrophils #   


 


VBG pH   


 


Sodium  124 L  


 


Chloride  92.3 L  


 


Carbon Dioxide  16 L  


 


BUN  34 H  


 


Creatinine   


 


Glucose  103 H  


 


POC Glucose   124 H  111 H


 


Lactic Acid   


 


Calcium  7.9 L  


 


Phosphorus   


 


C-Reactive Protein   


 


Urine WBC (Auto)   














  08/12/17 08/12/17 08/12/17





  02:25 03:06 04:17


 


WBC   


 


Lymph % (Auto)   


 


Lymph #   


 


Seg Neutrophils %   


 


Seg Neutrophils #   


 


VBG pH   


 


Sodium  128 L  


 


Chloride  94.0 L  


 


Carbon Dioxide  19 L  


 


BUN  34 H  


 


Creatinine   


 


Glucose  136 H  


 


POC Glucose   164 H  181 H


 


Lactic Acid   


 


Calcium  8.1 L  


 


Phosphorus   


 


C-Reactive Protein   


 


Urine WBC (Auto)   














  08/12/17 08/12/17 08/12/17





  04:53 05:32 05:33


 


WBC   


 


Lymph % (Auto)   


 


Lymph #   


 


Seg Neutrophils %   


 


Seg Neutrophils #   


 


VBG pH   


 


Sodium    129 L


 


Chloride    95.3 L


 


Carbon Dioxide    17 L


 


BUN    36 H


 


Creatinine   


 


Glucose    155 H


 


POC Glucose  184 H  171 H 


 


Lactic Acid   


 


Calcium    7.7 L


 


Phosphorus   


 


C-Reactive Protein   


 


Urine WBC (Auto)   














  08/12/17 08/12/17 08/12/17





  07:56 08:20 09:04


 


WBC   


 


Lymph % (Auto)   


 


Lymph #   


 


Seg Neutrophils %   


 


Seg Neutrophils #   


 


VBG pH   


 


Sodium   129 L 


 


Chloride   96.0 L 


 


Carbon Dioxide   18 L 


 


BUN   33 H 


 


Creatinine   


 


Glucose   124 H 


 


POC Glucose  131 H   126 H


 


Lactic Acid   


 


Calcium   7.9 L 


 


Phosphorus   


 


C-Reactive Protein   


 


Urine WBC (Auto)   














  08/12/17 08/12/17 08/12/17





  09:58 12:13 12:59


 


WBC   


 


Lymph % (Auto)   


 


Lymph #   


 


Seg Neutrophils %   


 


Seg Neutrophils #   


 


VBG pH   


 


Sodium    128 L


 


Chloride    94.6 L


 


Carbon Dioxide    17 L


 


BUN    32 H


 


Creatinine   


 


Glucose    292 H


 


POC Glucose  142 H  295 H 


 


Lactic Acid   


 


Calcium    8.0 L


 


Phosphorus   


 


C-Reactive Protein   


 


Urine WBC (Auto)   














  08/12/17 08/12/17 08/12/17





  14:43 14:43 15:57


 


WBC   


 


Lymph % (Auto)   


 


Lymph #   


 


Seg Neutrophils %   


 


Seg Neutrophils #   


 


VBG pH   


 


Sodium   


 


Chloride   


 


Carbon Dioxide   


 


BUN   


 


Creatinine   


 


Glucose   


 


POC Glucose    431 H


 


Lactic Acid  2.90 H*  


 


Calcium   


 


Phosphorus   


 


C-Reactive Protein   34.10 H 


 


Urine WBC (Auto)   














  08/12/17 08/12/17 08/13/17





  20:51 21:13 03:51


 


WBC   


 


Lymph % (Auto)   


 


Lymph #   


 


Seg Neutrophils %   


 


Seg Neutrophils #   


 


VBG pH   


 


Sodium  130 L   130 L


 


Chloride  94.9 L   94.4 L


 


Carbon Dioxide  21 L   20 L


 


BUN  33 H   31 H


 


Creatinine   


 


Glucose  217 H   277 H


 


POC Glucose   241 H 


 


Lactic Acid   


 


Calcium  7.7 L   8.2 L


 


Phosphorus   


 


C-Reactive Protein   


 


Urine WBC (Auto)   














  08/13/17 08/13/17 08/13/17





  07:22 11:31 11:34


 


WBC    15.4 H


 


Lymph % (Auto)    5.2 L


 


Lymph #    0.8 L


 


Seg Neutrophils %    89.7 H


 


Seg Neutrophils #    13.8 H


 


VBG pH   


 


Sodium   


 


Chloride   


 


Carbon Dioxide   


 


BUN   


 


Creatinine   


 


Glucose   


 


POC Glucose  264 H  273 H 


 


Lactic Acid   


 


Calcium   


 


Phosphorus   


 


C-Reactive Protein   


 


Urine WBC (Auto)   











Chest x-ray: image reviewed

## 2017-08-13 NOTE — PROGRESS NOTE
Assessment and Plan


Assessment and plan: 





DKA.  Resolved.  





Septic shock.  Continue IV antibiotics.  Patient now requiring pressors of 

Levophed.  Wean to maintain MAP greater than 65





Furunculosis.   Continue IV antibiotics.  ID following.  Follow-up culture 

results.





Herpes zoster.  Continue acyclovir per ID





Diabetes mellitus type 2.  Continue sliding scale and long-acting insulin.  We 

will add Lantus at bedtime.  Tight glycemic control.





Acute renal failure.  Nephrology following.  Etiology likely secondary to sepsis

/ATN/BRANDY.  Cont supportive care for BRANDY, avoid nephrotoxins, NSAIDs, IV contrast





Pseudohyponatremia.  Follow BMP.





UTI.  Continue IV antibiotics.





DVT prophylaxis.  Continue Lovenox.











History


Interval history: 





57-year-old female who was admitted with DKA and sepsis.  Patient noted to have 

multiple abscesses.  Patient currently hypotensive requiring pressors.





Hospitalist Physical





- Constitutional


Vitals: 


 











Temp Pulse Resp BP Pulse Ox


 


 98.1 F   98 H  18   103/59   95 


 


 08/13/17 08:00  08/13/17 10:00  08/13/17 05:37  08/13/17 09:05  08/12/17 19:40











General appearance: Present: no acute distress, other (non-toxic appearance)





- EENT


Eyes: Present: PERRL, EOM intact


ENT: hearing intact, clear oral mucosa, dentition normal





- Neck


Neck: Present: supple, normal ROM





- Respiratory


Respiratory effort: normal


Respiratory: bilateral: CTA





- Cardiovascular


Rhythm: regular


Heart Sounds: Present: S1 & S2.  Absent: gallop, rub





- Extremities


Extremities: no ischemia, No edema, Full ROM





- Abdominal


General gastrointestinal: soft, non-tender, non-distended, normal bowel sounds





- Integumentary


Integumentary: Present: erythema (erythema along right flank without vesicles 

in a dermatomal distribution,furuncles at right axilla,left thigh and right 

labium)





- Neurologic


Neurologic: CNII-XII intact, moves all extremities





Results





- Labs


CBC & Chem 7: 


 08/11/17 13:00





 08/13/17 03:51


Labs: 


 Laboratory Last Values











WBC  19.8 K/mm3 (4.5-11.0)  H  08/11/17  13:00    


 


RBC  4.30 M/mm3 (3.65-5.03)   08/11/17  13:00    


 


Hgb  12.2 gm/dl (10.1-14.3)   08/11/17  13:00    


 


Hct  37.3 % (30.3-42.9)   08/11/17  13:00    


 


MCV  87 fl (79-97)   08/11/17  13:00    


 


MCH  28 pg (28-32)   08/11/17  13:00    


 


MCHC  33 % (30-34)   08/11/17  13:00    


 


RDW  13.7 % (13.2-15.2)   08/11/17  13:00    


 


Plt Count  256 K/mm3 (140-440)   08/11/17  13:00    


 


Add Manual Diff  Complete   08/11/17  13:00    


 


Total Counted  100   08/11/17  13:00    


 


Seg Neutrophils %  Np   08/11/17  13:00    


 


Seg Neuts % (Manual)  84.0 % (40.0-70.0)  H  08/11/17  13:00    


 


Band Neutrophils %  14.0 %  08/11/17  13:00    


 


Lymphocytes % (Manual)  2.0 % (13.4-35.0)  L  08/11/17  13:00    


 


Reactive Lymphs % (Man)  0 %  08/11/17  13:00    


 


Monocytes % (Manual)  0 % (0.0-7.3)   08/11/17  13:00    


 


Eosinophils % (Manual)  0 % (0.0-4.3)   08/11/17  13:00    


 


Basophils % (Manual)  0 % (0.0-1.8)   08/11/17  13:00    


 


Metamyelocytes %  0 %  08/11/17  13:00    


 


Myelocytes %  0 %  08/11/17  13:00    


 


Promyelocytes %  0 %  08/11/17  13:00    


 


Blast Cells %  0 %  08/11/17  13:00    


 


Nucleated RBC %  Not Reportable   08/11/17  13:00    


 


Seg Neutrophils # Man  16.6 K/mm3 (1.8-7.7)  H  08/11/17  13:00    


 


Band Neutrophils #  2.8 K/mm3  08/11/17  13:00    


 


Lymphocytes # (Manual)  0.4 K/mm3 (1.2-5.4)  L  08/11/17  13:00    


 


Abs React Lymphs (Man)  0.0 K/mm3  08/11/17  13:00    


 


Monocytes # (Manual)  0.0 K/mm3 (0.0-0.8)   08/11/17  13:00    


 


Eosinophils # (Manual)  0.0 K/mm3 (0.0-0.4)   08/11/17  13:00    


 


Basophils # (Manual)  0.0 K/mm3 (0.0-0.1)   08/11/17  13:00    


 


Metamyelocytes #  0.0 K/mm3  08/11/17  13:00    


 


Myelocytes #  0.0 K/mm3  08/11/17  13:00    


 


Promyelocytes #  0.0 K/mm3  08/11/17  13:00    


 


Blast Cells #  0.0 K/mm3  08/11/17  13:00    


 


WBC Morphology  Not Reportable   08/11/17  13:00    


 


Hypersegmented Neuts  Not Reportable   08/11/17  13:00    


 


Hyposegmented Neuts  Not Reportable   08/11/17  13:00    


 


Hypogranular Neuts  Not Reportable   08/11/17  13:00    


 


Smudge Cells  Not Reportable   08/11/17  13:00    


 


Toxic Granulation  Not Reportable   08/11/17  13:00    


 


Toxic Vacuolation  Not Reportable   08/11/17  13:00    


 


Dohle Bodies  Not Reportable   08/11/17  13:00    


 


Pelger-Huet Anomaly  Not Reportable   08/11/17  13:00    


 


Luis Rods  Not Reportable   08/11/17  13:00    


 


Platelet Estimate  Appears normal   08/11/17  13:00    


 


Clumped Platelets  Not Reportable   08/11/17  13:00    


 


Plt Clumps, EDTA  Not Reportable   08/11/17  13:00    


 


Large Platelets  Few   08/11/17  13:00    


 


Giant Platelets  Not Reportable   08/11/17  13:00    


 


Platelet Satelliting  Not Reportable   08/11/17  13:00    


 


Plt Morphology Comment  Not Reportable   08/11/17  13:00    


 


RBC Morphology  Normal   08/11/17  13:00    


 


Dimorphic RBCs  Not Reportable   08/11/17  13:00    


 


Polychromasia  Not Reportable   08/11/17  13:00    


 


Hypochromasia  Not Reportable   08/11/17  13:00    


 


Poikilocytosis  Not Reportable   08/11/17  13:00    


 


Anisocytosis  Not Reportable   08/11/17  13:00    


 


Microcytosis  Not Reportable   08/11/17  13:00    


 


Macrocytosis  Not Reportable   08/11/17  13:00    


 


Spherocytes  Not Reportable   08/11/17  13:00    


 


Pappenheimer Bodies  Not Reportable   08/11/17  13:00    


 


Sickle Cells  Not Reportable   08/11/17  13:00    


 


Target Cells  Not Reportable   08/11/17  13:00    


 


Tear Drop Cells  Not Reportable   08/11/17  13:00    


 


Ovalocytes  Not Reportable   08/11/17  13:00    


 


Helmet Cells  Not Reportable   08/11/17  13:00    


 


[place] Bodies  Not Reportable   08/11/17  13:00    


 


Cabot Rings  Not Reportable   08/11/17  13:00    


 


Hasmukh Cells  Not Reportable   08/11/17  13:00    


 


Bite Cells  Not Reportable   08/11/17  13:00    


 


Crenated Cell  Not Reportable   08/11/17  13:00    


 


Elliptocytes  Not Reportable   08/11/17  13:00    


 


Acanthocytes (Spur)  Not Reportable   08/11/17  13:00    


 


Rouleaux  Not Reportable   08/11/17  13:00    


 


Hemoglobin C Crystals  Not Reportable   08/11/17  13:00    


 


Schistocytes  Not Reportable   08/11/17  13:00    


 


Malaria parasites  Not Reportable   08/11/17  13:00    


 


Thor Bodies  Not Reportable   08/11/17  13:00    


 


Hem Pathologist Commnt  No   08/11/17  13:00    


 


VBG pH  7.284  (7.320-7.420)  L  08/11/17  16:18    


 


Sodium  130 mmol/L (137-145)  L  08/13/17  03:51    


 


Potassium  4.3 mmol/L (3.6-5.0)   08/13/17  03:51    


 


Chloride  94.4 mmol/L ()  L  08/13/17  03:51    


 


Carbon Dioxide  20 mmol/L (22-30)  L  08/13/17  03:51    


 


Anion Gap  20 mmol/L  08/13/17  03:51    


 


BUN  31 mg/dL (7-17)  H  08/13/17  03:51    


 


Creatinine  1.1 mg/dL (0.7-1.2)   08/13/17  03:51    


 


Estimated GFR  52 ml/min  08/13/17  03:51    


 


BUN/Creatinine Ratio  28.18 %  08/13/17  03:51    


 


Glucose  277 mg/dL ()  H  08/13/17  03:51    


 


POC Glucose  264  ()  H  08/13/17  07:22    


 


Ketones Quantitative  Negative  (Negative)   08/11/17  13:54    


 


Lactic Acid  2.90 mmol/L (0.7-2.0)  H*  08/12/17  14:43    


 


Calcium  8.2 mg/dL (8.4-10.2)  L  08/13/17  03:51    


 


Phosphorus  2.10 mg/dL (2.5-4.5)  L  08/11/17  22:30    


 


Magnesium  1.90 mg/dL (1.7-2.3)   08/11/17  22:30    


 


Total Bilirubin  0.60 mg/dL (0.1-1.2)   08/11/17  13:00    


 


AST  14 units/L (5-40)   08/11/17  13:00    


 


ALT  13 units/L (7-56)   08/11/17  13:00    


 


Alkaline Phosphatase  105 units/L ()   08/11/17  13:00    


 


C-Reactive Protein  34.10 mg/dL (0.00-1.30)  H  08/12/17  14:43    


 


Total Protein  6.9 g/dL (6.3-8.2)   08/11/17  13:00    


 


Albumin  2.7 g/dL (3.9-5)  L  08/11/17  13:00    


 


Albumin/Globulin Ratio  0.6 %  08/11/17  13:00    


 


Urine Color  Yellow  (Yellow)   08/11/17  Unknown


 


Urine Turbidity  Cloudy  (Clear)   08/11/17  Unknown


 


Urine pH  5.0  (5.0-7.0)   08/11/17  Unknown


 


Ur Specific Gravity  1.024  (1.003-1.030)   08/11/17  Unknown


 


Urine Protein  <15 mg/dl mg/dL (Negative)   08/11/17  Unknown


 


Urine Glucose (UA)  >=500 mg/dL (Negative)   08/11/17  Unknown


 


Urine Ketones  20 mg/dL (Negative)   08/11/17  Unknown


 


Urine Blood  Sm  (Negative)   08/11/17  Unknown


 


Urine Nitrite  Neg  (Negative)   08/11/17  Unknown


 


Urine Bilirubin  Neg  (Negative)   08/11/17  Unknown


 


Urine Urobilinogen  2.0 mg/dL (<2.0)   08/11/17  Unknown


 


Ur Leukocyte Esterase  Lg  (Negative)   08/11/17  Unknown


 


Urine WBC (Auto)  > 182.0 /HPF (0.0-6.0)  H  08/11/17  Unknown


 


Urine RBC (Auto)  7.0 /HPF (0.0-6.0)   08/11/17  Unknown


 


U Epithel Cells (Auto)  9.0 /HPF (0-13.0)   08/11/17  Unknown


 


Urine Bacteria (Auto)  1+ /HPF (Negative)   08/11/17  Unknown


 


Urine Mucus  Few /HPF  08/11/17  Unknown


 


HIV 1&2 Antibody Rapid  Non react  (Non React)   08/13/17  03:51    


 


HIV P24 Antigen  Non react  (Non React)   08/13/17  03:51

## 2017-08-13 NOTE — CONSULTATION
History of Present Illness





- Reason for Consult


Consult date: 08/13/17


acute renal failure, hyponatremia


Requesting physician: YARED LERNER





- History of Present Illness


 This is a 56yo CF with past medical history of hypertension, type 2 DM 

diagnosed >12 years ago, insulin dependent, hypothyroidisms, who initially 

presented to [facility] ER complaining of generalized weakness, right lateral flank 

wall redness, pain and discomfort. Pt was tachycardic, hypotensive, labs showed 

evidence of leukocytosis, patient was also found to have severely elevated 

serum glucose > 650 with low Na at 118 along with elevated BUN/Cr at 32/1.3mg/

dl on admission. Pt was admitted for treatment of possible sepsis due to Rt 

axillar cellulitis/abscess and for hyperglycemic hyperosmolar state. renal 

consult is requested for management of hyponatremia and BRANDY.








Past History


Past Medical History: diabetes, hypertension, hypothyroidism


Past Surgical History: Other (Spinal Fusion)





Medications and Allergies


 Allergies











Allergy/AdvReac Type Severity Reaction Status Date / Time


 


codeine Allergy  Rash Verified 08/11/17 10:49











 Home Medications











 Medication  Instructions  Recorded  Confirmed  Last Taken  Type


 


Escitalopram [Lexapro] 10 mg PO DAILY 08/11/17 08/11/17 08/11/17 History


 


Levothyroxine [Synthroid] 50 mcg PO QAM 08/11/17 08/11/17 08/11/17 History


 


Lisinopril [Zestril] 20 mg PO QDAY 08/11/17 08/11/17 08/11/17 History


 


PARoxetine CR (NF) [Paxil (Nf)] 12.5 mg PO QAM 08/11/17 08/11/17 08/11/17 

History


 


Rosuvastatin (Nf) [Crestor] 20 mg PO QHS 08/11/17 08/11/17 08/11/17 History











Active Meds: 


Active Medications





Acetaminophen (Tylenol)  650 mg PO Q4H PRN


   PRN Reason: Pain MILD(1-3)/Fever >100.5/HA


Acetaminophen/Hydrocodone Bitart (Norco 5/325)  1 each PO Q4H PRN


   PRN Reason: Pain, Moderate (4-6)


   Last Admin: 08/13/17 05:37 Dose:  1 each


Atorvastatin Calcium (Lipitor)  40 mg PO QHS JESSY


   Last Admin: 08/12/17 21:30 Dose:  40 mg


Bisacodyl (Dulcolax)  10 mg NC QDAY PRN


   PRN Reason: Constipation unrelieved by MOM


Dextrose (D50w (25gm))  0 ml IV PRN PRN


   PRN Reason: Hypoglycemia


Dextrose (D50w (25gm))  0 gm IV PRN PRN


   PRN Reason: Hypoglycemia


   Last Admin: 08/11/17 23:55 Dose:  10 gm


Enoxaparin Sodium (Lovenox)  40 mg SUB-Q QDAY@1000 [facility]


   Last Admin: 08/13/17 09:04 Dose:  40 mg


Escitalopram Oxalate (Lexapro)  10 mg PO DAILY [facility]


   Last Admin: 08/13/17 09:04 Dose:  10 mg


Famotidine (Pepcid)  20 mg PO QDAY [facility]


   Last Admin: 08/13/17 09:05 Dose:  20 mg


Hydromorphone HCl (Dilaudid)  0.5 mg IV Q3H PRN


   PRN Reason: Pain , Severe (7-10)


   Last Admin: 08/12/17 21:31 Dose:  0.5 mg


Sodium Chloride (Nacl 0.9% 1000 Ml)  1,000 mls @ 125 mls/hr IV AS DIRECT [facility]


   Last Admin: 08/11/17 22:55 Dose:  125 mls/hr


Vancomycin HCl 1,250 mg/ (Sodium Chloride)  262.5 mls @ 166.667 mls/hr IV Q24H 

[facility]


   Last Admin: 08/12/17 17:19 Dose:  166.667 mls/hr


Sodium Chloride (Nacl 0.9% 1000 Ml)  1,000 mls @ 75 mls/hr IV AS DIRECT [facility]


   Last Admin: 08/13/17 05:38 Dose:  75 mls/hr


Acyclovir 700 mg/ Sodium (Chloride)  114 mls @ 114 mls/hr IV Q8H [facility]


   Last Admin: 08/13/17 05:36 Dose:  114 mls/hr


Insulin Aspart (Novolog)  0 units SUB-Q ACHS [facility]


   PRN Reason: Protocol


   Last Admin: 08/13/17 08:18 Dose:  6 units


Insulin Human Isoph/Insulin Regular (Novolin 70/30)  25 unit SUB-Q QAMDIAB [facility]


   Last Admin: 08/13/17 08:18 Dose:  25 unit


Insulin Human Isoph/Insulin Regular (Novolin 70/30)  15 unit SUB-Q QPM [facility]


   Last Admin: 08/12/17 17:13 Dose:  15 unit


Levothyroxine Sodium (Synthroid)  50 mcg PO 0600 [facility]


   Last Admin: 08/13/17 05:36 Dose:  50 mcg


Lisinopril (Zestril)  20 mg PO QDAY [facility]


   Last Admin: 08/13/17 09:05 Dose:  Not Given


Magnesium Hydroxide (Milk Of Magnesia)  30 ml PO Q4H PRN


   PRN Reason: Constipation


Ondansetron HCl (Zofran)  4 mg IV Q8H PRN


   PRN Reason: N/V unrelieved by Reglan


   Last Admin: 08/11/17 21:34 Dose:  4 mg


Paroxetine HCl (Paxil (Nf))  12.5 mg PO QDAY [facility]


   Last Admin: 08/13/17 09:05 Dose:  12.5 mg


Sodium Chloride (Sodium Chloride Flush Syringe 10 Ml)  10 ml IV PRN NR


   Stop: 08/14/17 13:59


Vancomycin HCl (Vancomycin Pharmacy To Dose)  1 each IV PKCONSULT [facility]


   PRN Reason: Protocol











Review of Systems


All systems: negative


Constitutional: weakness


Integumentary: rash, redness, sores





Exam





- Vital Signs


Vital signs: 


 Vital Signs











Temp Pulse Resp BP Pulse Ox


 


 97.9 F   125 H  18   107/55   98 


 


 08/11/17 10:38  08/11/17 10:38  08/11/17 10:38  08/11/17 10:38  08/11/17 10:38














- General Appearance


General appearance: well-nourished, appears stated age


EENT: ATNC, PERRL, mucous membranes moist


Neck: Present: neck supple


Respiratory: Clear to Ascultation


Heart: regular, S1S2


Gastrointestinal: Present: normoactive bowel sounds


Integumentary: rash (erythema at right flank ), other (no edema )


Neurologic: no focal deficit, alert and oriented x3, strength 5/5, CN 3-12 

intact


Psychiatric: mood/affect appropriate, cooperative





Results





- Lab Results





 08/11/17 13:00





 08/13/17 03:51


 Most recent lab results











Calcium  8.2 mg/dL (8.4-10.2)  L  08/13/17  03:51    


 


Phosphorus  2.10 mg/dL (2.5-4.5)  L  08/11/17  22:30    


 


Magnesium  1.90 mg/dL (1.7-2.3)   08/11/17  22:30    








 Laboratory Tests











  08/11/17 08/11/17 08/11/17





  13:00 13:54 13:54


 


VBG pH   7.347 


 


Calcium  8.5  


 


Phosphorus    3.70


 


Magnesium    2.10


 


Total Bilirubin  0.60  


 


AST  14  


 


ALT  13  


 


Alkaline Phosphatase  105  


 


Total Protein  6.9  


 


Albumin  2.7 L  


 


Albumin/Globulin Ratio  0.6  


 


Urine Color   


 


Urine Turbidity   


 


Urine pH   


 


Ur Specific Gravity   


 


Urine Protein   


 


Urine Glucose (UA)   


 


Urine Ketones   


 


Urine Blood   


 


Urine Nitrite   


 


Urine Bilirubin   


 


Urine Urobilinogen   


 


Ur Leukocyte Esterase   


 


Urine WBC (Auto)   


 


Urine RBC (Auto)   


 


U Epithel Cells (Auto)   


 


Urine Bacteria (Auto)   


 


Urine Mucus   


 


HIV 1&2 Antibody Rapid   


 


HIV P24 Antigen   














  08/11/17 08/11/17 08/13/17





  16:18 Unknown 03:51


 


VBG pH  7.284 L  


 


Calcium   


 


Phosphorus   


 


Magnesium   


 


Total Bilirubin   


 


AST   


 


ALT   


 


Alkaline Phosphatase   


 


Total Protein   


 


Albumin   


 


Albumin/Globulin Ratio   


 


Urine Color   Yellow 


 


Urine Turbidity   Cloudy 


 


Urine pH   5.0 


 


Ur Specific Gravity   1.024 


 


Urine Protein   <15 mg/dl 


 


Urine Glucose (UA)   >=500 


 


Urine Ketones   20 


 


Urine Blood   Sm 


 


Urine Nitrite   Neg 


 


Urine Bilirubin   Neg 


 


Urine Urobilinogen   2.0 


 


Ur Leukocyte Esterase   Lg 


 


Urine WBC (Auto)   > 182.0 H 


 


Urine RBC (Auto)   7.0 


 


U Epithel Cells (Auto)   9.0 


 


Urine Bacteria (Auto)   1+ 


 


Urine Mucus   Few 


 


HIV 1&2 Antibody Rapid    Non react


 


HIV P24 Antigen    Non react














Assessment and Plan





- Patient Problems


(1) Type 2 diabetes mellitus with hyperosmolar nonketotic hyperglycemia


Current Visit: Yes   Status: Acute   


Plan to address problem: 


glucose control as per primary attending, much improved. 








(2) Acute renal failure


Current Visit: Yes   Status: Acute   


Qualifiers: 


   Acute renal failure type: unspecified   Qualified Code(s): N17.9 - Acute 

kidney failure, unspecified   


Plan to address problem: 


acute kidney injury due to pre-renal azotemia in the setting of uncontrolled 

glucose/sepsis.


renal function improved after aggressive glucose control/IVF and IV ABX 

treatment, with Cr at 1.1mg/dl. Cont NS at 75ml/hr 


Patient remains non-oliguric 


cont supportive care for BRANDY, avoid nephrotoxins, NSAIDs, IV contrast








(3) Sepsis


Current Visit: Yes   Status: Acute   


Qualifiers: 


   Sepsis type: S 


Plan to address problem: 


secondary to herpes zoster/superimposes cellulitis/abscess. Cont antiviral/ABXs 

as per ID recommendations 








(4) Herpes zoster


Current Visit: Yes   Status: Acute   


Qualifiers: 


   Herpes zoster complications: H   Herpes zoster neurologic complication detail

: H   Herpes zoster ocular complication detail: H 


Plan to address problem: 


on acyclovir. follow ID recs 








(5) Hyponatremia


Current Visit: Yes   Status: Acute   


Plan to address problem: 


combination of pseudohyponatremia in the setting of severe hyperglycemia 

initially and hypovolemic hyponatremia. pt remains asymptomatic currently with 

mild hyponatremia.  Na at 129 despite adequate glucose control. will cont NS at 

75ml/hr for now

## 2017-08-14 LAB
ANION GAP SERPL CALC-SCNC: 16 MMOL/L
BASOPHILS NFR BLD AUTO: 0.1 % (ref 0–1.8)
BUN SERPL-MCNC: 19 MG/DL (ref 7–17)
BUN/CREAT SERPL: 21.11 %
CALCIUM SERPL-MCNC: 7.8 MG/DL (ref 8.4–10.2)
CHLORIDE SERPL-SCNC: 98.9 MMOL/L (ref 98–107)
CO2 SERPL-SCNC: 20 MMOL/L (ref 22–30)
CRP SERPL-MCNC: 22.4 MG/DL (ref 0–1.3)
EOSINOPHIL NFR BLD AUTO: 1 % (ref 0–4.3)
GLUCOSE SERPL-MCNC: 192 MG/DL (ref 65–100)
HCT VFR BLD CALC: 35.1 % (ref 30.3–42.9)
HGB BLD-MCNC: 11.8 GM/DL (ref 10.1–14.3)
MCH RBC QN AUTO: 29 PG (ref 28–32)
MCHC RBC AUTO-ENTMCNC: 34 % (ref 30–34)
MCV RBC AUTO: 85 FL (ref 79–97)
PLATELET # BLD: 224 K/MM3 (ref 140–440)
POTASSIUM SERPL-SCNC: 3.8 MMOL/L (ref 3.6–5)
RBC # BLD AUTO: 4.11 M/MM3 (ref 3.65–5.03)
SODIUM SERPL-SCNC: 131 MMOL/L (ref 137–145)
WBC # BLD AUTO: 11.7 K/MM3 (ref 4.5–11)

## 2017-08-14 RX ADMIN — INSULIN ASPART SCH UNITS: 100 INJECTION, SOLUTION INTRAVENOUS; SUBCUTANEOUS at 08:44

## 2017-08-14 RX ADMIN — ACYCLOVIR SODIUM SCH MLS/HR: 500 INJECTION, SOLUTION INTRAVENOUS at 13:50

## 2017-08-14 RX ADMIN — INSULIN DETEMIR SCH UNITS: 100 INJECTION, SOLUTION SUBCUTANEOUS at 23:17

## 2017-08-14 RX ADMIN — VANCOMYCIN HYDROCHLORIDE SCH MLS/HR: 100 INJECTION, POWDER, LYOPHILIZED, FOR SOLUTION INTRAVENOUS at 13:50

## 2017-08-14 RX ADMIN — HYDROMORPHONE HYDROCHLORIDE PRN MG: 1 INJECTION, SOLUTION INTRAMUSCULAR; INTRAVENOUS; SUBCUTANEOUS at 13:51

## 2017-08-14 RX ADMIN — MIDODRINE HYDROCHLORIDE SCH MG: 5 TABLET ORAL at 13:50

## 2017-08-14 RX ADMIN — ACETAMINOPHEN PRN MG: 325 TABLET ORAL at 19:15

## 2017-08-14 RX ADMIN — PAROXETINE HYDROCHLORIDE SCH: 12.5 TABLET, FILM COATED, EXTENDED RELEASE ORAL at 10:23

## 2017-08-14 RX ADMIN — LEVOTHYROXINE SODIUM SCH MCG: 0.05 TABLET ORAL at 05:54

## 2017-08-14 RX ADMIN — LEVOFLOXACIN SCH MG: 500 TABLET, FILM COATED ORAL at 17:03

## 2017-08-14 RX ADMIN — HYDROMORPHONE HYDROCHLORIDE PRN MG: 1 INJECTION, SOLUTION INTRAMUSCULAR; INTRAVENOUS; SUBCUTANEOUS at 08:43

## 2017-08-14 RX ADMIN — ACYCLOVIR SODIUM SCH MLS/HR: 500 INJECTION, SOLUTION INTRAVENOUS at 05:54

## 2017-08-14 RX ADMIN — MIDODRINE HYDROCHLORIDE SCH MG: 5 TABLET ORAL at 22:05

## 2017-08-14 RX ADMIN — ACETAMINOPHEN PRN MG: 325 TABLET ORAL at 08:42

## 2017-08-14 RX ADMIN — ACYCLOVIR SODIUM SCH MLS/HR: 500 INJECTION, SOLUTION INTRAVENOUS at 23:16

## 2017-08-14 RX ADMIN — HYDROMORPHONE HYDROCHLORIDE PRN MG: 1 INJECTION, SOLUTION INTRAMUSCULAR; INTRAVENOUS; SUBCUTANEOUS at 01:05

## 2017-08-14 RX ADMIN — HYDROCODONE BITARTRATE AND ACETAMINOPHEN PRN EACH: 5; 325 TABLET ORAL at 23:16

## 2017-08-14 RX ADMIN — INSULIN ASPART SCH UNITS: 100 INJECTION, SOLUTION INTRAVENOUS; SUBCUTANEOUS at 12:13

## 2017-08-14 RX ADMIN — SODIUM CHLORIDE SCH MLS/HR: 0.9 INJECTION, SOLUTION INTRAVENOUS at 05:55

## 2017-08-14 RX ADMIN — INSULIN ASPART SCH UNITS: 100 INJECTION, SOLUTION INTRAVENOUS; SUBCUTANEOUS at 16:51

## 2017-08-14 RX ADMIN — LISINOPRIL SCH: 20 TABLET ORAL at 10:39

## 2017-08-14 RX ADMIN — INSULIN ASPART SCH UNITS: 100 INJECTION, SOLUTION INTRAVENOUS; SUBCUTANEOUS at 23:28

## 2017-08-14 RX ADMIN — ACETAMINOPHEN PRN MG: 325 TABLET ORAL at 13:50

## 2017-08-14 RX ADMIN — HYDROMORPHONE HYDROCHLORIDE PRN MG: 1 INJECTION, SOLUTION INTRAMUSCULAR; INTRAVENOUS; SUBCUTANEOUS at 19:14

## 2017-08-14 RX ADMIN — ENOXAPARIN SODIUM SCH MG: 100 INJECTION SUBCUTANEOUS at 10:23

## 2017-08-14 RX ADMIN — ESCITALOPRAM OXALATE SCH MG: 10 TABLET ORAL at 10:23

## 2017-08-14 RX ADMIN — FAMOTIDINE SCH MG: 20 TABLET ORAL at 10:22

## 2017-08-14 NOTE — PROGRESS NOTE
Assessment and Plan





- Patient Problems


(1) Cellulitis


Current Visit: Yes   Status: Acute   


Qualifiers: 


   Site of cellulitis: S   Site of cellulitis of extremity: S   Site of 

cellulitis of trunk: S   Laterality: L 





(2) Renal insufficiency


Current Visit: Yes   Status: Acute   





(3) Sepsis


Current Visit: Yes   Status: Acute   


Qualifiers: 


   Sepsis type: S 





(4) DKA (diabetic ketoacidoses)


Current Visit: Yes   Status: Acute   


Qualifiers: 


   Diabetes mellitus type: type 2   Diabetes mellitus complication detail: D 





(5) UTI (urinary tract infection)


Current Visit: Yes   Status: Acute   


Qualifiers: 


   Urinary tract infection type: acute cystitis   Hematuria presence: H   

Indwelling urinary catheter type: I   Encounter type: E 





(6) Discharge planning issues


Current Visit: Yes   Status: Acute   





Subjective


Date of service: 08/14/17


Principal diagnosis: Sepsis Syndrome; Cellulitis


Interval history: 





Seen and examined at bedside; 24 hour events reviewed; nursing and respiratory 

care staff consulted; no adverse overnight events reported to me;





Objective


 Vital Signs - 12hr











  08/14/17 08/14/17 08/14/17





  00:45 01:00 01:15


 


Temperature   


 


Pulse Rate 100 H 93 H 93 H


 


Respiratory 13 19 19





Rate   


 


Blood Pressure 124/62 115/55 98/56


 


O2 Sat by Pulse 99 100 96





Oximetry   














  08/14/17 08/14/17 08/14/17





  01:30 01:45 02:00


 


Temperature   


 


Pulse Rate 97 H 103 H 95 H


 


Respiratory 19 12 12





Rate   


 


Blood Pressure 115/57 126/70 110/62


 


O2 Sat by Pulse 94 99 98





Oximetry   














  08/14/17 08/14/17 08/14/17





  02:15 02:30 02:45


 


Temperature   


 


Pulse Rate 95 H 93 H 94 H


 


Respiratory 24 14 18





Rate   


 


Blood Pressure 108/54 118/63 108/68


 


O2 Sat by Pulse 96 97 96





Oximetry   














  08/14/17 08/14/17 08/14/17





  03:00 03:15 03:30


 


Temperature   


 


Pulse Rate 96 H 94 H 104 H


 


Respiratory 22 19 15





Rate   


 


Blood Pressure 108/68 113/59 111/68


 


O2 Sat by Pulse 96 93 96





Oximetry   














  08/14/17 08/14/17 08/14/17





  03:45 04:00 04:15


 


Temperature  98.7 F 


 


Pulse Rate  99 H 94 H


 


Respiratory  10 L 17





Rate   


 


Blood Pressure 119/72 128/59 112/56


 


O2 Sat by Pulse  97 95





Oximetry   














  08/14/17 08/14/17 08/14/17





  04:30 04:45 05:00


 


Temperature   


 


Pulse Rate 99 H 94 H 100 H


 


Respiratory 20 14 21





Rate   


 


Blood Pressure 129/58  


 


O2 Sat by Pulse 98 95 94





Oximetry   














  08/14/17 08/14/17 08/14/17





  05:15 05:31 05:45


 


Temperature   


 


Pulse Rate 99 H 100 H 98 H


 


Respiratory 17 22 29 H





Rate   


 


Blood Pressure   


 


O2 Sat by Pulse 96 95 98





Oximetry   














  08/14/17 08/14/17 08/14/17





  06:00 06:15 06:30


 


Temperature   


 


Pulse Rate 103 H 106 H 106 H


 


Respiratory 22 21 19





Rate   


 


Blood Pressure 116/59 123/62 127/63


 


O2 Sat by Pulse 96 96 96





Oximetry   














  08/14/17 08/14/17 08/14/17





  06:45 07:00 07:15


 


Temperature   


 


Pulse Rate 108 H 110 H 106 H


 


Respiratory 32 H 12 23





Rate   


 


Blood Pressure 134/62 120/67 125/71


 


O2 Sat by Pulse 98 98 96





Oximetry   














  08/14/17 08/14/17 08/14/17





  07:30 07:45 08:00


 


Temperature   99.4 F


 


Pulse Rate 101 H 101 H 101 H


 


Respiratory 17 18 21





Rate   


 


Blood Pressure 126/61 108/51 116/57


 


O2 Sat by Pulse 100 96 96





Oximetry   














  08/14/17 08/14/17 08/14/17





  08:15 08:30 08:45


 


Temperature   


 


Pulse Rate 99 H 97 H 97 H


 


Respiratory 25 H 18 11 L





Rate   


 


Blood Pressure 117/56 107/53 100/58


 


O2 Sat by Pulse 94 94 96





Oximetry   














  08/14/17 08/14/17 08/14/17





  09:00 09:15 09:31


 


Temperature   


 


Pulse Rate 98 H 97 H 105 H


 


Respiratory 17 19 14





Rate   


 


Blood Pressure 110/61 110/66 110/66


 


O2 Sat by Pulse 94 96 95





Oximetry   














  08/14/17 08/14/17 08/14/17





  09:45 10:00 10:15


 


Temperature   


 


Pulse Rate 94 H 97 H 96 H


 


Respiratory 20 20 17





Rate   


 


Blood Pressure 91/37 102/40 114/51


 


O2 Sat by Pulse 95 97 94





Oximetry   














  08/14/17 08/14/17 08/14/17





  10:30 10:45 11:00


 


Temperature   


 


Pulse Rate 94 H 88 89


 


Respiratory 23 16 18





Rate   


 


Blood Pressure 127/43 101/50 100/56


 


O2 Sat by Pulse 94 96 96





Oximetry   














  08/14/17 08/14/17 08/14/17





  11:14 11:15 11:31


 


Temperature 98.3 F  


 


Pulse Rate  90 85


 


Respiratory  12 16





Rate   


 


Blood Pressure  100/56 100/56


 


O2 Sat by Pulse  98 96





Oximetry   














  08/14/17 08/14/17 08/14/17





  11:45 12:00 12:15


 


Temperature   


 


Pulse Rate 89 87 84


 


Respiratory 12 16 16





Rate   


 


Blood Pressure 100/56 102/61 102/61


 


O2 Sat by Pulse 97 94 97





Oximetry   














  08/14/17





  12:31


 


Temperature 


 


Pulse Rate 86


 


Respiratory 15





Rate 


 


Blood Pressure 102/61


 


O2 Sat by Pulse 96





Oximetry 











Constitutional: no acute distress, alert


Eyes: non-icteric


ENT: oropharynx moist


Neck: supple, no lymphadenopathy


Effort: mildly labored


Ascultation: Bilateral: clear


Cardiovascular: regular rate and rhythm


Gastrointestinal: normoactive bowel sounds, soft, non-tender, non-distended


Integumentary: cellulitis, other (erythema to right upper back and axillary 

region; no graining lesion)


Extremities: no cyanosis, no edema, pulses normal, no ischemia or petechiae


Neurologic: normal mental status, non-focal exam, pupils equal and round, motor 

strength normal and


Psychiatric: mood appropriate, affect normal


CBC and BMP: 


 08/14/17 03:44





 08/14/17 03:44


Abnormal lab findings: 


 Abnormal Labs











  08/11/17 08/11/17 08/11/17





  16:18 16:18 16:39


 


WBC   


 


Lymph % (Auto)   


 


Lymph #   


 


Seg Neutrophils %   


 


Seg Neutrophils #   


 


VBG pH  7.284 L  


 


Sodium   126 L D 


 


Chloride   87.1 L 


 


Carbon Dioxide   21 L 


 


BUN   31 H 


 


Creatinine   


 


Glucose   410 H 


 


POC Glucose    334 H


 


Lactic Acid   


 


Calcium   8.0 L 


 


Phosphorus   


 


C-Reactive Protein   


 


Urine WBC (Auto)   


 


Urine Creatinine   


 


Urine Total Protein   














  08/11/17 08/11/17 08/11/17





  18:02 19:08 19:38


 


WBC   


 


Lymph % (Auto)   


 


Lymph #   


 


Seg Neutrophils %   


 


Seg Neutrophils #   


 


VBG pH   


 


Sodium    130 L


 


Chloride    94.1 L


 


Carbon Dioxide   


 


BUN    34 H


 


Creatinine    1.3 H


 


Glucose    223 H


 


POC Glucose  364 H  313 H 


 


Lactic Acid   


 


Calcium    7.9 L


 


Phosphorus   


 


C-Reactive Protein   


 


Urine WBC (Auto)   


 


Urine Creatinine   


 


Urine Total Protein   














  08/11/17 08/11/17 08/11/17





  20:07 21:00 21:47


 


WBC   


 


Lymph % (Auto)   


 


Lymph #   


 


Seg Neutrophils %   


 


Seg Neutrophils #   


 


VBG pH   


 


Sodium   


 


Chloride   


 


Carbon Dioxide   


 


BUN   


 


Creatinine   


 


Glucose   


 


POC Glucose  266 H  171 H  132 H


 


Lactic Acid   


 


Calcium   


 


Phosphorus   


 


C-Reactive Protein   


 


Urine WBC (Auto)   


 


Urine Creatinine   


 


Urine Total Protein   














  08/11/17 08/11/17 08/11/17





  22:30 23:49 Unknown


 


WBC   


 


Lymph % (Auto)   


 


Lymph #   


 


Seg Neutrophils %   


 


Seg Neutrophils #   


 


VBG pH   


 


Sodium  129 L  129 L 


 


Chloride  94.2 L  93.5 L 


 


Carbon Dioxide   


 


BUN  34 H  34 H 


 


Creatinine   


 


Glucose   104 H 


 


POC Glucose   


 


Lactic Acid   


 


Calcium  8.0 L  7.8 L 


 


Phosphorus  2.10 L  


 


C-Reactive Protein   


 


Urine WBC (Auto)    > 182.0 H


 


Urine Creatinine   


 


Urine Total Protein   














  08/12/17 08/12/17 08/12/17





  01:11 01:19 02:00


 


WBC   


 


Lymph % (Auto)   


 


Lymph #   


 


Seg Neutrophils %   


 


Seg Neutrophils #   


 


VBG pH   


 


Sodium  124 L  


 


Chloride  92.3 L  


 


Carbon Dioxide  16 L  


 


BUN  34 H  


 


Creatinine   


 


Glucose  103 H  


 


POC Glucose   124 H  111 H


 


Lactic Acid   


 


Calcium  7.9 L  


 


Phosphorus   


 


C-Reactive Protein   


 


Urine WBC (Auto)   


 


Urine Creatinine   


 


Urine Total Protein   














  08/12/17 08/12/17 08/12/17





  02:25 03:06 04:17


 


WBC   


 


Lymph % (Auto)   


 


Lymph #   


 


Seg Neutrophils %   


 


Seg Neutrophils #   


 


VBG pH   


 


Sodium  128 L  


 


Chloride  94.0 L  


 


Carbon Dioxide  19 L  


 


BUN  34 H  


 


Creatinine   


 


Glucose  136 H  


 


POC Glucose   164 H  181 H


 


Lactic Acid   


 


Calcium  8.1 L  


 


Phosphorus   


 


C-Reactive Protein   


 


Urine WBC (Auto)   


 


Urine Creatinine   


 


Urine Total Protein   














  08/12/17 08/12/17 08/12/17





  04:53 05:32 05:33


 


WBC   


 


Lymph % (Auto)   


 


Lymph #   


 


Seg Neutrophils %   


 


Seg Neutrophils #   


 


VBG pH   


 


Sodium    129 L


 


Chloride    95.3 L


 


Carbon Dioxide    17 L


 


BUN    36 H


 


Creatinine   


 


Glucose    155 H


 


POC Glucose  184 H  171 H 


 


Lactic Acid   


 


Calcium    7.7 L


 


Phosphorus   


 


C-Reactive Protein   


 


Urine WBC (Auto)   


 


Urine Creatinine   


 


Urine Total Protein   














  08/12/17 08/12/17 08/12/17





  07:56 08:20 09:04


 


WBC   


 


Lymph % (Auto)   


 


Lymph #   


 


Seg Neutrophils %   


 


Seg Neutrophils #   


 


VBG pH   


 


Sodium   129 L 


 


Chloride   96.0 L 


 


Carbon Dioxide   18 L 


 


BUN   33 H 


 


Creatinine   


 


Glucose   124 H 


 


POC Glucose  131 H   126 H


 


Lactic Acid   


 


Calcium   7.9 L 


 


Phosphorus   


 


C-Reactive Protein   


 


Urine WBC (Auto)   


 


Urine Creatinine   


 


Urine Total Protein   














  08/12/17 08/12/17 08/12/17





  09:58 12:13 12:59


 


WBC   


 


Lymph % (Auto)   


 


Lymph #   


 


Seg Neutrophils %   


 


Seg Neutrophils #   


 


VBG pH   


 


Sodium    128 L


 


Chloride    94.6 L


 


Carbon Dioxide    17 L


 


BUN    32 H


 


Creatinine   


 


Glucose    292 H


 


POC Glucose  142 H  295 H 


 


Lactic Acid   


 


Calcium    8.0 L


 


Phosphorus   


 


C-Reactive Protein   


 


Urine WBC (Auto)   


 


Urine Creatinine   


 


Urine Total Protein   














  08/12/17 08/12/17 08/12/17





  14:43 14:43 15:57


 


WBC   


 


Lymph % (Auto)   


 


Lymph #   


 


Seg Neutrophils %   


 


Seg Neutrophils #   


 


VBG pH   


 


Sodium   


 


Chloride   


 


Carbon Dioxide   


 


BUN   


 


Creatinine   


 


Glucose   


 


POC Glucose    431 H


 


Lactic Acid  2.90 H*  


 


Calcium   


 


Phosphorus   


 


C-Reactive Protein   34.10 H 


 


Urine WBC (Auto)   


 


Urine Creatinine   


 


Urine Total Protein   














  08/12/17 08/12/17 08/13/17





  20:51 21:13 03:51


 


WBC   


 


Lymph % (Auto)   


 


Lymph #   


 


Seg Neutrophils %   


 


Seg Neutrophils #   


 


VBG pH   


 


Sodium  130 L   130 L


 


Chloride  94.9 L   94.4 L


 


Carbon Dioxide  21 L   20 L


 


BUN  33 H   31 H


 


Creatinine   


 


Glucose  217 H   277 H


 


POC Glucose   241 H 


 


Lactic Acid   


 


Calcium  7.7 L   8.2 L


 


Phosphorus   


 


C-Reactive Protein   


 


Urine WBC (Auto)   


 


Urine Creatinine   


 


Urine Total Protein   














  08/13/17 08/13/17 08/13/17





  07:22 11:31 11:34


 


WBC    15.4 H


 


Lymph % (Auto)    5.2 L


 


Lymph #    0.8 L


 


Seg Neutrophils %    89.7 H


 


Seg Neutrophils #    13.8 H


 


VBG pH   


 


Sodium   


 


Chloride   


 


Carbon Dioxide   


 


BUN   


 


Creatinine   


 


Glucose   


 


POC Glucose  264 H  273 H 


 


Lactic Acid   


 


Calcium   


 


Phosphorus   


 


C-Reactive Protein   


 


Urine WBC (Auto)   


 


Urine Creatinine   


 


Urine Total Protein   














  08/13/17 08/13/17 08/13/17





  16:31 21:49 Unknown


 


WBC   


 


Lymph % (Auto)   


 


Lymph #   


 


Seg Neutrophils %   


 


Seg Neutrophils #   


 


VBG pH   


 


Sodium   


 


Chloride   


 


Carbon Dioxide   


 


BUN   


 


Creatinine   


 


Glucose   


 


POC Glucose  328 H  264 H 


 


Lactic Acid   


 


Calcium   


 


Phosphorus   


 


C-Reactive Protein   


 


Urine WBC (Auto)    106.0 H


 


Urine Creatinine   


 


Urine Total Protein   














  08/13/17 08/14/17 08/14/17





  Unknown 03:44 03:44


 


WBC   11.7 H 


 


Lymph % (Auto)   6.7 L 


 


Lymph #   0.8 L 


 


Seg Neutrophils %   88.1 H 


 


Seg Neutrophils #   10.3 H 


 


VBG pH   


 


Sodium    131 L


 


Chloride   


 


Carbon Dioxide    20 L


 


BUN    19 H


 


Creatinine   


 


Glucose    192 H


 


POC Glucose   


 


Lactic Acid   


 


Calcium    7.8 L


 


Phosphorus   


 


C-Reactive Protein    22.40 H


 


Urine WBC (Auto)   


 


Urine Creatinine  88.9 H  


 


Urine Total Protein  61 H  














  08/14/17





  07:41


 


WBC 


 


Lymph % (Auto) 


 


Lymph # 


 


Seg Neutrophils % 


 


Seg Neutrophils # 


 


VBG pH 


 


Sodium 


 


Chloride 


 


Carbon Dioxide 


 


BUN 


 


Creatinine 


 


Glucose 


 


POC Glucose  197 H


 


Lactic Acid 


 


Calcium 


 


Phosphorus 


 


C-Reactive Protein 


 


Urine WBC (Auto) 


 


Urine Creatinine 


 


Urine Total Protein

## 2017-08-14 NOTE — PROGRESS NOTE
Assessment and Plan


Assessment and plan: 





DKA.  Resolved.  





Septic shock.  Improved.  Continue IV antibiotics.  Levophed has been weaned 

off.  





Furunculosis.   Continue IV antibiotics.  ID following.  Follow-up culture 

results.





Herpes zoster.  Continue acyclovir per ID





Diabetes mellitus type 2.  Continue sliding scale and long-acting insulin.  We 

will add Lantus at bedtime.  Tight glycemic control.





Acute renal failure.  Nephrology following.  Etiology likely secondary to sepsis

/ATN/BRANDY/volume depletion.  Cont supportive care for BRANDY, avoid nephrotoxins, 

NSAIDs, IV contrast





Pseudohyponatremia.  Follow BMP.





UTI.  Continue IV antibiotics.  Urine culture reveals Escherichia coli that is 

sensitive to Levaquin





DVT prophylaxis.  Continue Lovenox.











History


Interval history: 





57-year-old female who was admitted with DKA and sepsis.  Patient noted to have 

multiple abscesses.  The patient has been off pressors since 7 AM this morning.





Hospitalist Physical





- Constitutional


Vitals: 


 











Temp Pulse Resp BP Pulse Ox


 


 98.7 F   96 H  17   114/51   94 


 


 08/14/17 04:00  08/14/17 10:15  08/14/17 10:15  08/14/17 10:15  08/14/17 10:15











General appearance: Present: no acute distress, other (non-toxic appearance)





- EENT


Eyes: Present: PERRL, EOM intact


ENT: hearing intact, clear oral mucosa, dentition normal





- Neck


Neck: Present: supple, normal ROM





- Respiratory


Respiratory effort: normal


Respiratory: bilateral: CTA





- Cardiovascular


Rhythm: regular


Heart Sounds: Present: S1 & S2.  Absent: gallop, rub





- Extremities


Extremities: no ischemia, No edema, Full ROM





- Abdominal


General gastrointestinal: soft, non-tender, non-distended, normal bowel sounds





- Integumentary


Integumentary: Present: clear, warm, dry





- Neurologic


Neurologic: CNII-XII intact, moves all extremities





Results





- Labs


CBC & Chem 7: 


 08/14/17 03:44





 08/14/17 03:44


Labs: 


 Laboratory Last Values











WBC  11.7 K/mm3 (4.5-11.0)  H  08/14/17  03:44    


 


RBC  4.11 M/mm3 (3.65-5.03)   08/14/17  03:44    


 


Hgb  11.8 gm/dl (10.1-14.3)   08/14/17  03:44    


 


Hct  35.1 % (30.3-42.9)   08/14/17  03:44    


 


MCV  85 fl (79-97)   08/14/17  03:44    


 


MCH  29 pg (28-32)   08/14/17  03:44    


 


MCHC  34 % (30-34)   08/14/17  03:44    


 


RDW  14.3 % (13.2-15.2)   08/14/17  03:44    


 


Plt Count  224 K/mm3 (140-440)   08/14/17  03:44    


 


Lymph % (Auto)  6.7 % (13.4-35.0)  L  08/14/17  03:44    


 


Mono % (Auto)  4.1 % (0.0-7.3)   08/14/17  03:44    


 


Eos % (Auto)  1.0 % (0.0-4.3)   08/14/17  03:44    


 


Baso % (Auto)  0.1 % (0.0-1.8)   08/14/17  03:44    


 


Lymph #  0.8 K/mm3 (1.2-5.4)  L  08/14/17  03:44    


 


Mono #  0.5 K/mm3 (0.0-0.8)   08/14/17  03:44    


 


Eos #  0.1 K/mm3 (0.0-0.4)   08/14/17  03:44    


 


Baso #  0.0 K/mm3 (0.0-0.1)   08/14/17  03:44    


 


Add Manual Diff  Complete   08/11/17  13:00    


 


Total Counted  100   08/11/17  13:00    


 


Seg Neutrophils %  88.1 % (40.0-70.0)  H  08/14/17  03:44    


 


Seg Neuts % (Manual)  84.0 % (40.0-70.0)  H  08/11/17  13:00    


 


Band Neutrophils %  14.0 %  08/11/17  13:00    


 


Lymphocytes % (Manual)  2.0 % (13.4-35.0)  L  08/11/17  13:00    


 


Reactive Lymphs % (Man)  0 %  08/11/17  13:00    


 


Monocytes % (Manual)  0 % (0.0-7.3)   08/11/17  13:00    


 


Eosinophils % (Manual)  0 % (0.0-4.3)   08/11/17  13:00    


 


Basophils % (Manual)  0 % (0.0-1.8)   08/11/17  13:00    


 


Metamyelocytes %  0 %  08/11/17  13:00    


 


Myelocytes %  0 %  08/11/17  13:00    


 


Promyelocytes %  0 %  08/11/17  13:00    


 


Blast Cells %  0 %  08/11/17  13:00    


 


Nucleated RBC %  Not Reportable   08/11/17  13:00    


 


Seg Neutrophils #  10.3 K/mm3 (1.8-7.7)  H  08/14/17  03:44    


 


Seg Neutrophils # Man  16.6 K/mm3 (1.8-7.7)  H  08/11/17  13:00    


 


Band Neutrophils #  2.8 K/mm3  08/11/17  13:00    


 


Lymphocytes # (Manual)  0.4 K/mm3 (1.2-5.4)  L  08/11/17  13:00    


 


Abs React Lymphs (Man)  0.0 K/mm3  08/11/17  13:00    


 


Monocytes # (Manual)  0.0 K/mm3 (0.0-0.8)   08/11/17  13:00    


 


Eosinophils # (Manual)  0.0 K/mm3 (0.0-0.4)   08/11/17  13:00    


 


Basophils # (Manual)  0.0 K/mm3 (0.0-0.1)   08/11/17  13:00    


 


Metamyelocytes #  0.0 K/mm3  08/11/17  13:00    


 


Myelocytes #  0.0 K/mm3  08/11/17  13:00    


 


Promyelocytes #  0.0 K/mm3  08/11/17  13:00    


 


Blast Cells #  0.0 K/mm3  08/11/17  13:00    


 


WBC Morphology  Not Reportable   08/11/17  13:00    


 


Hypersegmented Neuts  Not Reportable   08/11/17  13:00    


 


Hyposegmented Neuts  Not Reportable   08/11/17  13:00    


 


Hypogranular Neuts  Not Reportable   08/11/17  13:00    


 


Smudge Cells  Not Reportable   08/11/17  13:00    


 


Toxic Granulation  Not Reportable   08/11/17  13:00    


 


Toxic Vacuolation  Not Reportable   08/11/17  13:00    


 


Dohle Bodies  Not Reportable   08/11/17  13:00    


 


Pelger-Huet Anomaly  Not Reportable   08/11/17  13:00    


 


Luis Rods  Not Reportable   08/11/17  13:00    


 


Platelet Estimate  Appears normal   08/11/17  13:00    


 


Clumped Platelets  Not Reportable   08/11/17  13:00    


 


Plt Clumps, EDTA  Not Reportable   08/11/17  13:00    


 


Large Platelets  Few   08/11/17  13:00    


 


Giant Platelets  Not Reportable   08/11/17  13:00    


 


Platelet Satelliting  Not Reportable   08/11/17  13:00    


 


Plt Morphology Comment  Not Reportable   08/11/17  13:00    


 


RBC Morphology  Normal   08/11/17  13:00    


 


Dimorphic RBCs  Not Reportable   08/11/17  13:00    


 


Polychromasia  Not Reportable   08/11/17  13:00    


 


Hypochromasia  Not Reportable   08/11/17  13:00    


 


Poikilocytosis  Not Reportable   08/11/17  13:00    


 


Anisocytosis  Not Reportable   08/11/17  13:00    


 


Microcytosis  Not Reportable   08/11/17  13:00    


 


Macrocytosis  Not Reportable   08/11/17  13:00    


 


Spherocytes  Not Reportable   08/11/17  13:00    


 


Pappenheimer Bodies  Not Reportable   08/11/17  13:00    


 


Sickle Cells  Not Reportable   08/11/17  13:00    


 


Target Cells  Not Reportable   08/11/17  13:00    


 


Tear Drop Cells  Not Reportable   08/11/17  13:00    


 


Ovalocytes  Not Reportable   08/11/17  13:00    


 


Helmet Cells  Not Reportable   08/11/17  13:00    


 


[place] Bodies  Not Reportable   08/11/17  13:00    


 


Cabot Rings  Not Reportable   08/11/17  13:00    


 


Hasmukh Cells  Not Reportable   08/11/17  13:00    


 


Bite Cells  Not Reportable   08/11/17  13:00    


 


Crenated Cell  Not Reportable   08/11/17  13:00    


 


Elliptocytes  Not Reportable   08/11/17  13:00    


 


Acanthocytes (Spur)  Not Reportable   08/11/17  13:00    


 


Rouleaux  Not Reportable   08/11/17  13:00    


 


Hemoglobin C Crystals  Not Reportable   08/11/17  13:00    


 


Schistocytes  Not Reportable   08/11/17  13:00    


 


Malaria parasites  Not Reportable   08/11/17  13:00    


 


Thor Bodies  Not Reportable   08/11/17  13:00    


 


Hem Pathologist Commnt  No   08/11/17  13:00    


 


VBG pH  7.284  (7.320-7.420)  L  08/11/17  16:18    


 


Sodium  131 mmol/L (137-145)  L  08/14/17  03:44    


 


Potassium  3.8 mmol/L (3.6-5.0)   08/14/17  03:44    


 


Chloride  98.9 mmol/L ()   08/14/17  03:44    


 


Carbon Dioxide  20 mmol/L (22-30)  L  08/14/17  03:44    


 


Anion Gap  16 mmol/L  08/14/17  03:44    


 


BUN  19 mg/dL (7-17)  H  08/14/17  03:44    


 


Creatinine  0.9 mg/dL (0.7-1.2)   08/14/17  03:44    


 


Estimated GFR  > 60 ml/min  08/14/17  03:44    


 


BUN/Creatinine Ratio  21.11 %  08/14/17  03:44    


 


Glucose  192 mg/dL ()  H  08/14/17  03:44    


 


POC Glucose  264  ()  H  08/13/17  21:49    


 


Ketones Quantitative  Negative  (Negative)   08/11/17  13:54    


 


Lactic Acid  1.80 mmol/L (0.7-2.0)   08/14/17  03:44    


 


Calcium  7.8 mg/dL (8.4-10.2)  L  08/14/17  03:44    


 


Phosphorus  2.10 mg/dL (2.5-4.5)  L  08/11/17  22:30    


 


Magnesium  1.90 mg/dL (1.7-2.3)   08/11/17  22:30    


 


Total Bilirubin  0.60 mg/dL (0.1-1.2)   08/11/17  13:00    


 


AST  14 units/L (5-40)   08/11/17  13:00    


 


ALT  13 units/L (7-56)   08/11/17  13:00    


 


Alkaline Phosphatase  105 units/L ()   08/11/17  13:00    


 


C-Reactive Protein  22.40 mg/dL (0.00-1.30)  H  08/14/17  03:44    


 


Total Protein  6.9 g/dL (6.3-8.2)   08/11/17  13:00    


 


Albumin  2.7 g/dL (3.9-5)  L  08/11/17  13:00    


 


Albumin/Globulin Ratio  0.6 %  08/11/17  13:00    


 


Urine Color  Yellow  (Yellow)   08/13/17  Unknown


 


Urine Turbidity  Slightly-cloudy  (Clear)   08/13/17  Unknown


 


Urine pH  6.0  (5.0-7.0)   08/13/17  Unknown


 


Ur Specific Gravity  1.016  (1.003-1.030)   08/13/17  Unknown


 


Urine Protein  30 mg/dl mg/dL (Negative)   08/13/17  Unknown


 


Urine Glucose (UA)  >=500 mg/dL (Negative)   08/13/17  Unknown


 


Urine Ketones  Neg mg/dL (Negative)   08/13/17  Unknown


 


Urine Blood  Neg  (Negative)   08/13/17  Unknown


 


Urine Nitrite  Neg  (Negative)   08/13/17  Unknown


 


Urine Bilirubin  Neg  (Negative)   08/13/17  Unknown


 


Urine Urobilinogen  4.0 mg/dL (<2.0)   08/13/17  Unknown


 


Ur Leukocyte Esterase  Lg  (Negative)   08/13/17  Unknown


 


Urine WBC (Auto)  106.0 /HPF (0.0-6.0)  H  08/13/17  Unknown


 


Urine RBC (Auto)  2.0 /HPF (0.0-6.0)   08/13/17  Unknown


 


U Epithel Cells (Auto)  10.0 /HPF (0-13.0)   08/13/17  Unknown


 


Urine Bacteria (Auto)  1+ /HPF (Negative)   08/13/17  Unknown


 


Urine Mucus  Few /HPF  08/13/17  Unknown


 


Urine Osmolality  544 Mosm/kg  08/13/17  Unknown


 


Urine Creatinine  88.9 mg/dL (0.1-20.0)  H  08/13/17  Unknown


 


Urine Microalbumin  < 1.2 mg/dL (0.1-34.0)   08/13/17  Unknown


 


Microalb/Creat Ratio  13.4 ug/mg  08/13/17  Unknown


 


Urine Sodium  11 mEq/L  08/13/17  Unknown


 


Urine Total Protein  61 mg/dL (5-11.8)  H  08/13/17  Unknown


 


HIV 1&2 Antibody Rapid  Non react  (Non React)   08/13/17  03:51    


 


HIV P24 Antigen  Non react  (Non React)   08/13/17  03:51

## 2017-08-14 NOTE — PROGRESS NOTE
Assessment and Plan





- Patient Problems


(1) Abscess


Current Visit: Yes   Status: Acute   


Plan to address problem: 


   1. Continue Vancomycin while inpatient and then to oral therapy (Bactrim and 

Clindamycin) at discharge.


    2. Chlorhexidine bath may also be beneficial while patient is in hospital.


    3. I will sign off. Please call again if there are additional questions or 

concerns.








(2) Herpes zoster


Current Visit: Yes   Status: Acute   


Qualifiers: 


   Herpes zoster complications: without complications   Herpes zoster 

neurologic complication detail: H   Herpes zoster ocular complication detail: H

   Qualified Code(s): B02.9 - Zoster without complications   


Plan to address problem: 


   1. Okay to change to oral Acyclovir 800mg PO tid to complete a total of 7 

days (through Aug. 19, 2017).


    2. HIV screening is negative.








Subjective


Date of service: 08/14/17


Principal diagnosis: Sepsis Syndrome; Cellulitis


Interval history: 





No new complaints.





Objective





- Constitutional


Vitals: 


 Vital Signs











Temp Pulse Resp BP Pulse Ox


 


 98.3 F   89   18   100/56   96 


 


 08/14/17 11:14  08/14/17 11:00  08/14/17 11:00  08/14/17 11:00  08/14/17 11:00








 Temperature -Last 24 Hours











Temperature                    98.3 F


 


Temperature                    99.4 F


 


Temperature                    98.7 F


 


Temperature                    98.6 F


 


Temperature                    98.6 F


 


Temperature                    98.8 F














General appearance: Present: no acute distress, other (non-toxic appearance)





- Respiratory


Respiratory effort: normal


Respiratory: bilateral: CTA





- Cardiovascular


Rhythm: regular


Heart Sounds: Present: S1 & S2


Extremities: No edema





- Gastrointestinal


General gastrointestinal: Present: soft, non-distended





- Integumentary


Integumentary: erythema (decreased erythema of furuncles at right axilla and 

labium), rash (right flank erythema remains in a dermatomal distribution, no 

vesicles are seen)





- Psychiatric


Psychiatric: appropriate mood/affect





- Labs


CBC & Chem 7: 


 08/14/17 03:44





 08/14/17 03:44


Labs: 


 Abnormal lab results











  08/13/17 08/13/17 08/13/17 Range/Units





  11:31 16:31 21:49 


 


WBC     (4.5-11.0)  K/mm3


 


Lymph % (Auto)     (13.4-35.0)  %


 


Lymph #     (1.2-5.4)  K/mm3


 


Seg Neutrophils %     (40.0-70.0)  %


 


Seg Neutrophils #     (1.8-7.7)  K/mm3


 


Sodium     (137-145)  mmol/L


 


Carbon Dioxide     (22-30)  mmol/L


 


BUN     (7-17)  mg/dL


 


Glucose     ()  mg/dL


 


POC Glucose  273 H  328 H  264 H  ()  


 


Calcium     (8.4-10.2)  mg/dL


 


C-Reactive Protein     (0.00-1.30)  mg/dL


 


Urine WBC (Auto)     (0.0-6.0)  /HPF


 


Urine Creatinine     (0.1-20.0)  mg/dL


 


Urine Total Protein     (5-11.8)  mg/dL














  08/13/17 08/13/17 08/14/17 Range/Units





  Unknown Unknown 03:44 


 


WBC    11.7 H  (4.5-11.0)  K/mm3


 


Lymph % (Auto)    6.7 L  (13.4-35.0)  %


 


Lymph #    0.8 L  (1.2-5.4)  K/mm3


 


Seg Neutrophils %    88.1 H  (40.0-70.0)  %


 


Seg Neutrophils #    10.3 H  (1.8-7.7)  K/mm3


 


Sodium     (137-145)  mmol/L


 


Carbon Dioxide     (22-30)  mmol/L


 


BUN     (7-17)  mg/dL


 


Glucose     ()  mg/dL


 


POC Glucose     ()  


 


Calcium     (8.4-10.2)  mg/dL


 


C-Reactive Protein     (0.00-1.30)  mg/dL


 


Urine WBC (Auto)  106.0 H    (0.0-6.0)  /HPF


 


Urine Creatinine   88.9 H   (0.1-20.0)  mg/dL


 


Urine Total Protein   61 H   (5-11.8)  mg/dL














  08/14/17 08/14/17 Range/Units





  03:44 07:41 


 


WBC    (4.5-11.0)  K/mm3


 


Lymph % (Auto)    (13.4-35.0)  %


 


Lymph #    (1.2-5.4)  K/mm3


 


Seg Neutrophils %    (40.0-70.0)  %


 


Seg Neutrophils #    (1.8-7.7)  K/mm3


 


Sodium  131 L   (137-145)  mmol/L


 


Carbon Dioxide  20 L   (22-30)  mmol/L


 


BUN  19 H   (7-17)  mg/dL


 


Glucose  192 H   ()  mg/dL


 


POC Glucose   197 H  ()  


 


Calcium  7.8 L   (8.4-10.2)  mg/dL


 


C-Reactive Protein  22.40 H   (0.00-1.30)  mg/dL


 


Urine WBC (Auto)    (0.0-6.0)  /HPF


 


Urine Creatinine    (0.1-20.0)  mg/dL


 


Urine Total Protein    (5-11.8)  mg/dL








 Microbiology





08/12/17 09:35   Urine,Catheterized - Indwelling Catheter   Urine Culture - 

Final


                            Escherichia Coli


08/11/17 13:04   Peripheral/Venous   Blood Culture - Preliminary


                            NO GROWTH AFTER 48 HOURS


08/11/17 13:00   Peripheral/Venous   Blood Culture - Preliminary


                            NO GROWTH AFTER 48 HOURS

## 2017-08-14 NOTE — PROGRESS NOTE
Assessment and Plan





- Patient Problems


(1) Acute kidney injury


Current Visit: Yes   Status: Acute   


Plan to address problem: 


Prerenal azotemia secondary to volume depletion secondary to osmotic diuresis.  

Kidney function has improved back to normal with volume repletion.








(2) Hyponatremia


Current Visit: Yes   Status: Acute   


Plan to address problem: 


Factitious hyponatremia secondary to hyperglycemia and true hyponatremia 

secondary to volume depletion.  Improved with blood sugar control and volume 

repletion.








(3) Type 2 diabetes mellitus with hyperosmolar nonketotic hyperglycemia


Current Visit: Yes   Status: Acute   


Plan to address problem: 


Blood sugar control improved though still suboptimal.  Continue management by 

primary attending








(4) UTI (urinary tract infection)


Current Visit: Yes   Status: Acute   


Qualifiers: 


   Urinary tract infection type: acute cystitis   Hematuria presence: H   

Indwelling urinary catheter type: I   Encounter type: E 


Plan to address problem: 


Continue antibiotics








(5) Essential (primary) hypertension


Current Visit: Yes   Status: Acute   





(6) Sepsis


Current Visit: Yes   Status: Acute   


Qualifiers: 


   Sepsis type: S 


Plan to address problem: 


Off of Levophed.  Continue antibiotics.  Follow up final identification and 

sensitivities








(7) Herpes zoster


Current Visit: Yes   Status: Acute   


Qualifiers: 


   Herpes zoster complications: H   Herpes zoster neurologic complication detail

: H   Herpes zoster ocular complication detail: H 


Plan to address problem: 


Management by primary attending








Subjective


Date of service: 08/14/17


Principal diagnosis: Sepsis Syndrome; Cellulitis


Interval history: 





Patient seen lying in bed. Still C/o Pain left side.  Passing urine only once a 

day she states.  Eating breakfast





Objective





- Exam


Narrative Exam: 





[Middle-aged  female lying in bed] in no acute distress


HEENT [normocephalic atraumatic, pupils equal reactive to light, pink, clear 

oropharynx]


Neck [supple, no thyromegaly no jugular venous distention]


CVS [S1-S2 regular rate rhythm without murmur, rub or gallop]


Chest [clear to auscultation]


Abdomen [soft nondistended nontender no organomegaly no bruit bowel sounds 

present]


Extremities [no edema no cyanosis or clubbing]


Neuro [awake, alert oriented x3 no gross deficit]








- Vital Signs


Vital signs: 


 Vital Signs - 12hr











  08/13/17 08/13/17 08/13/17





  21:15 21:30 21:45


 


Temperature   


 


Pulse Rate 95 H 92 H 93 H


 


Respiratory 12 14 11 L





Rate   


 


Blood Pressure 114/69 92/50 100/55


 


O2 Sat by Pulse 95 90 94





Oximetry   














  08/13/17 08/13/17 08/13/17





  22:00 22:15 22:30


 


Temperature   


 


Pulse Rate 92 H 87 91 H


 


Respiratory 14 17 18





Rate   


 


Blood Pressure 94/47 93/49 88/50


 


O2 Sat by Pulse 93 95 95





Oximetry   














  08/13/17 08/13/17 08/13/17





  22:45 23:00 23:15


 


Temperature   


 


Pulse Rate 93 H 95 H 98 H


 


Respiratory 18 22 26 H





Rate   


 


Blood Pressure 97/48 99/51 101/52


 


O2 Sat by Pulse 95 95 96





Oximetry   














  08/13/17 08/13/17 08/13/17





  23:30 23:31 23:45


 


Temperature   


 


Pulse Rate 95 H 97 H 91 H


 


Respiratory 20 19 16





Rate   


 


Blood Pressure 95/55 95/55 105/51


 


O2 Sat by Pulse 95 95 95





Oximetry   














  08/14/17 08/14/17 08/14/17





  00:00 00:15 00:31


 


Temperature 98.6 F  


 


Pulse Rate 90 99 H 101 H


 


Respiratory 15 18 15





Rate   


 


Blood Pressure 99/55 99/55 120/78


 


O2 Sat by Pulse 96 97 97





Oximetry   














  08/14/17 08/14/17 08/14/17





  00:45 01:00 01:15


 


Temperature   


 


Pulse Rate 100 H 93 H 93 H


 


Respiratory 13 19 19





Rate   


 


Blood Pressure 124/62 115/55 98/56


 


O2 Sat by Pulse 99 100 96





Oximetry   














  08/14/17 08/14/17 08/14/17





  01:30 01:45 02:00


 


Temperature   


 


Pulse Rate 97 H 103 H 95 H


 


Respiratory 19 12 12





Rate   


 


Blood Pressure 115/57 126/70 110/62


 


O2 Sat by Pulse 94 99 98





Oximetry   














  08/14/17 08/14/17 08/14/17





  02:15 02:30 02:45


 


Temperature   


 


Pulse Rate 95 H 93 H 94 H


 


Respiratory 24 14 18





Rate   


 


Blood Pressure 108/54 118/63 108/68


 


O2 Sat by Pulse 96 97 96





Oximetry   














  08/14/17 08/14/17 08/14/17





  03:00 03:15 03:30


 


Temperature   


 


Pulse Rate 96 H 94 H 104 H


 


Respiratory 22 19 15





Rate   


 


Blood Pressure 108/68 113/59 111/68


 


O2 Sat by Pulse 96 93 96





Oximetry   














  08/14/17 08/14/17 08/14/17





  03:45 04:00 04:15


 


Temperature  98.7 F 


 


Pulse Rate  99 H 94 H


 


Respiratory  10 L 17





Rate   


 


Blood Pressure 119/72 128/59 112/56


 


O2 Sat by Pulse  97 95





Oximetry   














  08/14/17 08/14/17 08/14/17





  04:30 04:45 05:00


 


Temperature   


 


Pulse Rate 99 H 94 H 100 H


 


Respiratory 20 14 21





Rate   


 


Blood Pressure 129/58  


 


O2 Sat by Pulse 98 95 94





Oximetry   














  08/14/17 08/14/17 08/14/17





  05:15 05:31 05:45


 


Temperature   


 


Pulse Rate 99 H 100 H 98 H


 


Respiratory 17 22 29 H





Rate   


 


Blood Pressure   


 


O2 Sat by Pulse 96 95 98





Oximetry   














  08/14/17 08/14/17 08/14/17





  06:00 06:15 06:30


 


Temperature   


 


Pulse Rate 103 H 106 H 106 H


 


Respiratory 22 21 19





Rate   


 


Blood Pressure 116/59 123/62 127/63


 


O2 Sat by Pulse 96 96 96





Oximetry   














  08/14/17 08/14/17 08/14/17





  06:45 07:00 07:15


 


Temperature   


 


Pulse Rate 108 H 110 H 106 H


 


Respiratory 32 H 12 23





Rate   


 


Blood Pressure 134/62 120/67 125/71


 


O2 Sat by Pulse 98 98 96





Oximetry   














  08/14/17 08/14/17 08/14/17





  07:30 07:45 08:00


 


Temperature   


 


Pulse Rate 101 H 101 H 101 H


 


Respiratory 17 18 21





Rate   


 


Blood Pressure 126/61 108/51 116/57


 


O2 Sat by Pulse 100 96 96





Oximetry   














  08/14/17 08/14/17 08/14/17





  08:15 08:30 08:45


 


Temperature   


 


Pulse Rate 99 H 97 H 97 H


 


Respiratory 25 H 18 11 L





Rate   


 


Blood Pressure 117/56 107/53 100/58


 


O2 Sat by Pulse 94 94 96





Oximetry   














  08/14/17





  09:00


 


Temperature 


 


Pulse Rate 98 H


 


Respiratory 17





Rate 


 


Blood Pressure 110/61


 


O2 Sat by Pulse 94





Oximetry 














- Lab





 08/14/17 03:44





 08/14/17 03:44


 Most recent lab results











Calcium  7.8 mg/dL (8.4-10.2)  L  08/14/17  03:44    


 


Phosphorus  2.10 mg/dL (2.5-4.5)  L  08/11/17  22:30    


 


Magnesium  1.90 mg/dL (1.7-2.3)   08/11/17  22:30    


 


Urine Creatinine  88.9 mg/dL (0.1-20.0)  H  08/13/17  Unknown


 


Urine Sodium  11 mEq/L  08/13/17  Unknown


 


Urine Total Protein  61 mg/dL (5-11.8)  H  08/13/17  Unknown

## 2017-08-14 NOTE — CONSULTATION
HISTORY OF PRESENT ILLNESS:  This patient was admitted by Dr. Jane Friend because

of pain to the flank on the right side and posteriorly with discomfort.  She

also complains of generalized weakness.  She is a known case of diabetes

mellitus.  She could not, from what she told me, control her sugar.  She had no

fevers and no chills.  Along with that she had evidence of an abscess in the

labia majora on the right side.  Her blood sugar upon admission was 659, the

sodium was very low at 118.  I was asked to evaluate her from a general surgical

point of view.  The patient has had _____ in and out.



PHYSICAL EXAMINATION:

GENERAL:  Apparently from examining her, she looked thin, slim, white female. 

She is in no distress.  According to our nursing staff, she looks much better

now.

HEAD AND NECK:  Negative.

BREASTS:  Symmetric.  No signs of specific masses.

HEART:  Sound normal.

ABDOMEN:  Protuberant, soft, benign.  Evidence of redness over the posterior

aspect of the upper chest on the right side and below the axilla on the right

side as well.  As to the vaginal aspect, there was an abscess there that is

about 1 x 1 cm.  I saw it yesterday when I examined her and today there is

almost complete improvement in that with very minimal redness.  There is no

evidence of any purulent formation.

EXTREMITIES:  Showed no significant edema.



IMPRESSION:  

1.  Multiple inflammatory areas in the back, right axilla and the labial area.  

2.  Diabetes mellitus.  This could not be controlled.  I am going to leave that

for the Internal Medicine.  Already infectious disease, Dr. Padilla is seeing

her and the patient at the present time is on Levaquin and she is on insulin

subcutaneously.  The treatment of this entity is just continued observation,

good control of her sugar and local care to the labial area which already, to

me, about a good 80% improved.  I am going to see her again tomorrow.





DD: 08/13/2017 15:41

DT: 08/13/2017 16:24

JOB# [id number]  [id number]

IRINA/DEVON

## 2017-08-14 NOTE — PROGRESS NOTE
Subjective


Patient Reports: Positive: feels better, pain is less


Narrative: 





feels much better , no hyperemia , complete healing of labia will see PRN ,





Objective


 Vital Signs - 12hr











  08/14/17 08/14/17 08/14/17





  04:15 04:30 04:45


 


Temperature   


 


Pulse Rate 94 H 99 H 94 H


 


Respiratory 17 20 14





Rate   


 


Blood Pressure 112/56 129/58 


 


O2 Sat by Pulse 95 98 95





Oximetry   














  08/14/17 08/14/17 08/14/17





  05:00 05:15 05:31


 


Temperature   


 


Pulse Rate 100 H 99 H 100 H


 


Respiratory 21 17 22





Rate   


 


Blood Pressure   


 


O2 Sat by Pulse 94 96 95





Oximetry   














  08/14/17 08/14/17 08/14/17





  05:45 06:00 06:15


 


Temperature   


 


Pulse Rate 98 H 103 H 106 H


 


Respiratory 29 H 22 21





Rate   


 


Blood Pressure  116/59 123/62


 


O2 Sat by Pulse 98 96 96





Oximetry   














  08/14/17 08/14/17 08/14/17





  06:30 06:45 07:00


 


Temperature   


 


Pulse Rate 106 H 108 H 110 H


 


Respiratory 19 32 H 12





Rate   


 


Blood Pressure 127/63 134/62 120/67


 


O2 Sat by Pulse 96 98 98





Oximetry   














  08/14/17 08/14/17 08/14/17





  07:15 07:30 07:45


 


Temperature   


 


Pulse Rate 106 H 101 H 101 H


 


Respiratory 23 17 18





Rate   


 


Blood Pressure 125/71 126/61 108/51


 


O2 Sat by Pulse 96 100 96





Oximetry   














  08/14/17 08/14/17 08/14/17





  08:00 08:15 08:30


 


Temperature 99.4 F  


 


Pulse Rate 101 H 99 H 97 H


 


Respiratory 21 25 H 18





Rate   


 


Blood Pressure 116/57 117/56 107/53


 


O2 Sat by Pulse 96 94 94





Oximetry   














  08/14/17 08/14/17 08/14/17





  08:45 09:00 09:15


 


Temperature   


 


Pulse Rate 97 H 98 H 97 H


 


Respiratory 11 L 17 19





Rate   


 


Blood Pressure 100/58 110/61 110/66


 


O2 Sat by Pulse 96 94 96





Oximetry   














  08/14/17 08/14/17 08/14/17





  09:31 09:45 10:00


 


Temperature   


 


Pulse Rate 105 H 94 H 97 H


 


Respiratory 14 20 20





Rate   


 


Blood Pressure 110/66 91/37 102/40


 


O2 Sat by Pulse 95 95 97





Oximetry   














  08/14/17 08/14/17 08/14/17





  10:15 10:30 10:45


 


Temperature   


 


Pulse Rate 96 H 94 H 88


 


Respiratory 17 23 16





Rate   


 


Blood Pressure 114/51 127/43 101/50


 


O2 Sat by Pulse 94 94 96





Oximetry   














  08/14/17 08/14/17 08/14/17





  11:00 11:14 11:15


 


Temperature  98.3 F 


 


Pulse Rate 89  90


 


Respiratory 18  12





Rate   


 


Blood Pressure 100/56  100/56


 


O2 Sat by Pulse 96  98





Oximetry   














  08/14/17 08/14/17 08/14/17





  11:31 11:45 12:00


 


Temperature   


 


Pulse Rate 85 89 87


 


Respiratory 16 12 16





Rate   


 


Blood Pressure 100/56 100/56 102/61


 


O2 Sat by Pulse 96 97 94





Oximetry   














  08/14/17 08/14/17 08/14/17





  12:15 12:31 12:45


 


Temperature   


 


Pulse Rate 84 86 91 H


 


Respiratory 16 15 19





Rate   


 


Blood Pressure 102/61 102/61 102/61


 


O2 Sat by Pulse 97 96 98





Oximetry   














  08/14/17 08/14/17 08/14/17





  13:00 13:15 13:31


 


Temperature   


 


Pulse Rate 86 84 82


 


Respiratory 22 24 24





Rate   


 


Blood Pressure 113/61 113/61 113/61


 


O2 Sat by Pulse 99 98 99





Oximetry   














  08/14/17 08/14/17 08/14/17





  13:45 13:50 13:51


 


Temperature   


 


Pulse Rate 86  


 


Respiratory 19 21 21





Rate   


 


Blood Pressure 113/61  


 


O2 Sat by Pulse 99  





Oximetry   














- Labs





 08/14/17 03:44





 08/14/17 03:44


 Diabetes panel











  08/14/17 Range/Units





  03:44 


 


Sodium  131 L  (137-145)  mmol/L


 


Potassium  3.8  (3.6-5.0)  mmol/L


 


Chloride  98.9  ()  mmol/L


 


Carbon Dioxide  20 L  (22-30)  mmol/L


 


BUN  19 H  (7-17)  mg/dL


 


Creatinine  0.9  (0.7-1.2)  mg/dL


 


Glucose  192 H  ()  mg/dL


 


Calcium  7.8 L  (8.4-10.2)  mg/dL








 Calcium panel











  08/14/17 Range/Units





  03:44 


 


Calcium  7.8 L  (8.4-10.2)  mg/dL








 Pituitary panel











  08/14/17 Range/Units





  03:44 


 


Sodium  131 L  (137-145)  mmol/L


 


Potassium  3.8  (3.6-5.0)  mmol/L


 


Chloride  98.9  ()  mmol/L


 


Carbon Dioxide  20 L  (22-30)  mmol/L


 


BUN  19 H  (7-17)  mg/dL


 


Creatinine  0.9  (0.7-1.2)  mg/dL


 


Glucose  192 H  ()  mg/dL


 


Calcium  7.8 L  (8.4-10.2)  mg/dL








 Adrenal panel











  08/14/17 Range/Units





  03:44 


 


Sodium  131 L  (137-145)  mmol/L


 


Potassium  3.8  (3.6-5.0)  mmol/L


 


Chloride  98.9  ()  mmol/L


 


Carbon Dioxide  20 L  (22-30)  mmol/L


 


BUN  19 H  (7-17)  mg/dL


 


Creatinine  0.9  (0.7-1.2)  mg/dL


 


Glucose  192 H  ()  mg/dL


 


Calcium  7.8 L  (8.4-10.2)  mg/dL

## 2017-08-15 LAB
ANION GAP SERPL CALC-SCNC: 18 MMOL/L
BASOPHILS NFR BLD AUTO: 0.2 % (ref 0–1.8)
BUN SERPL-MCNC: 13 MG/DL (ref 7–17)
BUN/CREAT SERPL: 18.57 %
CALCIUM SERPL-MCNC: 8 MG/DL (ref 8.4–10.2)
CHLORIDE SERPL-SCNC: 101.2 MMOL/L (ref 98–107)
CO2 SERPL-SCNC: 22 MMOL/L (ref 22–30)
EOSINOPHIL NFR BLD AUTO: 1 % (ref 0–4.3)
GLUCOSE SERPL-MCNC: 103 MG/DL (ref 65–100)
HCT VFR BLD CALC: 30.6 % (ref 30.3–42.9)
HGB BLD-MCNC: 10.5 GM/DL (ref 10.1–14.3)
MCH RBC QN AUTO: 29 PG (ref 28–32)
MCHC RBC AUTO-ENTMCNC: 34 % (ref 30–34)
MCV RBC AUTO: 85 FL (ref 79–97)
PLATELET # BLD: 235 K/MM3 (ref 140–440)
POTASSIUM SERPL-SCNC: 4.1 MMOL/L (ref 3.6–5)
RBC # BLD AUTO: 3.6 M/MM3 (ref 3.65–5.03)
SODIUM SERPL-SCNC: 137 MMOL/L (ref 137–145)
WBC # BLD AUTO: 9.5 K/MM3 (ref 4.5–11)

## 2017-08-15 RX ADMIN — ACYCLOVIR SCH MG: 800 TABLET ORAL at 21:18

## 2017-08-15 RX ADMIN — HYDROCODONE BITARTRATE AND ACETAMINOPHEN PRN EACH: 5; 325 TABLET ORAL at 10:05

## 2017-08-15 RX ADMIN — PAROXETINE HYDROCHLORIDE SCH MG: 12.5 TABLET, FILM COATED, EXTENDED RELEASE ORAL at 10:03

## 2017-08-15 RX ADMIN — ACYCLOVIR SCH MG: 800 TABLET ORAL at 10:04

## 2017-08-15 RX ADMIN — LEVOTHYROXINE SODIUM SCH MCG: 0.05 TABLET ORAL at 06:25

## 2017-08-15 RX ADMIN — HYDROMORPHONE HYDROCHLORIDE PRN MG: 1 INJECTION, SOLUTION INTRAMUSCULAR; INTRAVENOUS; SUBCUTANEOUS at 13:32

## 2017-08-15 RX ADMIN — LEVOFLOXACIN SCH MG: 500 TABLET, FILM COATED ORAL at 17:00

## 2017-08-15 RX ADMIN — INSULIN ASPART SCH UNITS: 100 INJECTION, SOLUTION INTRAVENOUS; SUBCUTANEOUS at 12:39

## 2017-08-15 RX ADMIN — LISINOPRIL SCH: 20 TABLET ORAL at 10:14

## 2017-08-15 RX ADMIN — INSULIN ASPART SCH UNITS: 100 INJECTION, SOLUTION INTRAVENOUS; SUBCUTANEOUS at 18:18

## 2017-08-15 RX ADMIN — VANCOMYCIN HYDROCHLORIDE SCH MLS/HR: 100 INJECTION, POWDER, LYOPHILIZED, FOR SOLUTION INTRAVENOUS at 10:08

## 2017-08-15 RX ADMIN — ESCITALOPRAM OXALATE SCH MG: 10 TABLET ORAL at 10:03

## 2017-08-15 RX ADMIN — ACYCLOVIR SODIUM SCH MLS/HR: 500 INJECTION, SOLUTION INTRAVENOUS at 10:05

## 2017-08-15 RX ADMIN — MIDODRINE HYDROCHLORIDE SCH MG: 5 TABLET ORAL at 22:14

## 2017-08-15 RX ADMIN — MIDODRINE HYDROCHLORIDE SCH: 5 TABLET ORAL at 14:00

## 2017-08-15 RX ADMIN — HYDROMORPHONE HYDROCHLORIDE PRN MG: 1 INJECTION, SOLUTION INTRAMUSCULAR; INTRAVENOUS; SUBCUTANEOUS at 19:29

## 2017-08-15 RX ADMIN — INSULIN ASPART SCH: 100 INJECTION, SOLUTION INTRAVENOUS; SUBCUTANEOUS at 22:04

## 2017-08-15 RX ADMIN — FAMOTIDINE SCH MG: 20 TABLET ORAL at 10:05

## 2017-08-15 RX ADMIN — VANCOMYCIN HYDROCHLORIDE SCH MLS/HR: 100 INJECTION, POWDER, LYOPHILIZED, FOR SOLUTION INTRAVENOUS at 23:22

## 2017-08-15 RX ADMIN — INSULIN DETEMIR SCH UNITS: 100 INJECTION, SOLUTION SUBCUTANEOUS at 22:14

## 2017-08-15 RX ADMIN — MIDODRINE HYDROCHLORIDE SCH MG: 5 TABLET ORAL at 06:35

## 2017-08-15 RX ADMIN — INSULIN ASPART SCH UNITS: 100 INJECTION, SOLUTION INTRAVENOUS; SUBCUTANEOUS at 10:04

## 2017-08-15 RX ADMIN — ACYCLOVIR SCH: 800 TABLET ORAL at 14:05

## 2017-08-15 RX ADMIN — ENOXAPARIN SODIUM SCH MG: 100 INJECTION SUBCUTANEOUS at 10:05

## 2017-08-15 NOTE — PROGRESS NOTE
Assessment and Plan


Assessment and plan: 





DKA.  Resolved.  





Septic shock.  Improved.  Continue IV antibiotics.  Levophed has been weaned 

off.  Patient will be transferred to the floor.  Blood cultures remained 

negative.





Furunculosis.   Continue IV antibiotics.  ID following.  Follow-up culture 

results.





Herpes zoster.  Continue acyclovir per ID





Diabetes mellitus type 2.  Continue sliding scale and long-acting insulin.  We 

will add Lantus at bedtime.  Tight glycemic control.





Acute renal failure.  Nephrology following.  Etiology likely secondary to sepsis

/ATN/BRANDY/volume depletion.  Cont supportive care for BRANDY, avoid nephrotoxins, 

NSAIDs, IV contrast





Pseudohyponatremia.  Follow BMP.





UTI.  Continue IV antibiotics.  Urine culture reveals Escherichia coli that is 

sensitive to Levaquin.  Blood cultures remained negative.





DVT prophylaxis.  Continue Lovenox.











History


Interval history: 





57-year-old female who was admitted with DKA and sepsis.  Patient noted to have 

multiple abscesses.  The patient has been off pressors since 7 AM this morning.





Hospitalist Physical





- Constitutional


Vitals: 


 











Temp Pulse Resp BP Pulse Ox


 


 97.9 F   79   13   118/62   94 


 


 08/15/17 04:00  08/15/17 10:14  08/15/17 08:15  08/15/17 10:14  08/15/17 08:15











General appearance: Present: no acute distress, other (non-toxic appearance)





- EENT


Eyes: Present: PERRL, EOM intact


ENT: hearing intact, clear oral mucosa, dentition normal





- Neck


Neck: Present: supple, normal ROM





- Respiratory


Respiratory effort: normal


Respiratory: bilateral: CTA





- Cardiovascular


Rhythm: regular


Heart Sounds: Present: S1 & S2.  Absent: gallop, rub





- Extremities


Extremities: no ischemia, No edema, Full ROM





- Abdominal


General gastrointestinal: soft, non-tender, non-distended, normal bowel sounds





- Integumentary


Integumentary: Present: clear, warm, dry





- Neurologic


Neurologic: CNII-XII intact, moves all extremities





Results





- Labs


CBC & Chem 7: 


 08/15/17 03:59





 08/15/17 03:59


Labs: 


 Laboratory Last Values











WBC  9.5 K/mm3 (4.5-11.0)   08/15/17  03:59    


 


RBC  3.60 M/mm3 (3.65-5.03)  L  08/15/17  03:59    


 


Hgb  10.5 gm/dl (10.1-14.3)   08/15/17  03:59    


 


Hct  30.6 % (30.3-42.9)   08/15/17  03:59    


 


MCV  85 fl (79-97)   08/15/17  03:59    


 


MCH  29 pg (28-32)   08/15/17  03:59    


 


MCHC  34 % (30-34)   08/15/17  03:59    


 


RDW  14.1 % (13.2-15.2)   08/15/17  03:59    


 


Plt Count  235 K/mm3 (140-440)   08/15/17  03:59    


 


Lymph % (Auto)  8.4 % (13.4-35.0)  L  08/15/17  03:59    


 


Mono % (Auto)  6.6 % (0.0-7.3)   08/15/17  03:59    


 


Eos % (Auto)  1.0 % (0.0-4.3)   08/15/17  03:59    


 


Baso % (Auto)  0.2 % (0.0-1.8)   08/15/17  03:59    


 


Lymph #  0.8 K/mm3 (1.2-5.4)  L  08/15/17  03:59    


 


Mono #  0.6 K/mm3 (0.0-0.8)   08/15/17  03:59    


 


Eos #  0.1 K/mm3 (0.0-0.4)   08/15/17  03:59    


 


Baso #  0.0 K/mm3 (0.0-0.1)   08/15/17  03:59    


 


Add Manual Diff  Complete   08/11/17  13:00    


 


Total Counted  100   08/11/17  13:00    


 


Seg Neutrophils %  83.8 % (40.0-70.0)  H  08/15/17  03:59    


 


Seg Neuts % (Manual)  84.0 % (40.0-70.0)  H  08/11/17  13:00    


 


Band Neutrophils %  14.0 %  08/11/17  13:00    


 


Lymphocytes % (Manual)  2.0 % (13.4-35.0)  L  08/11/17  13:00    


 


Reactive Lymphs % (Man)  0 %  08/11/17  13:00    


 


Monocytes % (Manual)  0 % (0.0-7.3)   08/11/17  13:00    


 


Eosinophils % (Manual)  0 % (0.0-4.3)   08/11/17  13:00    


 


Basophils % (Manual)  0 % (0.0-1.8)   08/11/17  13:00    


 


Metamyelocytes %  0 %  08/11/17  13:00    


 


Myelocytes %  0 %  08/11/17  13:00    


 


Promyelocytes %  0 %  08/11/17  13:00    


 


Blast Cells %  0 %  08/11/17  13:00    


 


Nucleated RBC %  Not Reportable   08/11/17  13:00    


 


Seg Neutrophils #  8.0 K/mm3 (1.8-7.7)  H  08/15/17  03:59    


 


Seg Neutrophils # Man  16.6 K/mm3 (1.8-7.7)  H  08/11/17  13:00    


 


Band Neutrophils #  2.8 K/mm3  08/11/17  13:00    


 


Lymphocytes # (Manual)  0.4 K/mm3 (1.2-5.4)  L  08/11/17  13:00    


 


Abs React Lymphs (Man)  0.0 K/mm3  08/11/17  13:00    


 


Monocytes # (Manual)  0.0 K/mm3 (0.0-0.8)   08/11/17  13:00    


 


Eosinophils # (Manual)  0.0 K/mm3 (0.0-0.4)   08/11/17  13:00    


 


Basophils # (Manual)  0.0 K/mm3 (0.0-0.1)   08/11/17  13:00    


 


Metamyelocytes #  0.0 K/mm3  08/11/17  13:00    


 


Myelocytes #  0.0 K/mm3  08/11/17  13:00    


 


Promyelocytes #  0.0 K/mm3  08/11/17  13:00    


 


Blast Cells #  0.0 K/mm3  08/11/17  13:00    


 


WBC Morphology  Not Reportable   08/11/17  13:00    


 


Hypersegmented Neuts  Not Reportable   08/11/17  13:00    


 


Hyposegmented Neuts  Not Reportable   08/11/17  13:00    


 


Hypogranular Neuts  Not Reportable   08/11/17  13:00    


 


Smudge Cells  Not Reportable   08/11/17  13:00    


 


Toxic Granulation  Not Reportable   08/11/17  13:00    


 


Toxic Vacuolation  Not Reportable   08/11/17  13:00    


 


Dohle Bodies  Not Reportable   08/11/17  13:00    


 


Pelger-Huet Anomaly  Not Reportable   08/11/17  13:00    


 


Luis Rods  Not Reportable   08/11/17  13:00    


 


Platelet Estimate  Appears normal   08/11/17  13:00    


 


Clumped Platelets  Not Reportable   08/11/17  13:00    


 


Plt Clumps, EDTA  Not Reportable   08/11/17  13:00    


 


Large Platelets  Few   08/11/17  13:00    


 


Giant Platelets  Not Reportable   08/11/17  13:00    


 


Platelet Satelliting  Not Reportable   08/11/17  13:00    


 


Plt Morphology Comment  Not Reportable   08/11/17  13:00    


 


RBC Morphology  Normal   08/11/17  13:00    


 


Dimorphic RBCs  Not Reportable   08/11/17  13:00    


 


Polychromasia  Not Reportable   08/11/17  13:00    


 


Hypochromasia  Not Reportable   08/11/17  13:00    


 


Poikilocytosis  Not Reportable   08/11/17  13:00    


 


Anisocytosis  Not Reportable   08/11/17  13:00    


 


Microcytosis  Not Reportable   08/11/17  13:00    


 


Macrocytosis  Not Reportable   08/11/17  13:00    


 


Spherocytes  Not Reportable   08/11/17  13:00    


 


Pappenheimer Bodies  Not Reportable   08/11/17  13:00    


 


Sickle Cells  Not Reportable   08/11/17  13:00    


 


Target Cells  Not Reportable   08/11/17  13:00    


 


Tear Drop Cells  Not Reportable   08/11/17  13:00    


 


Ovalocytes  Not Reportable   08/11/17  13:00    


 


Helmet Cells  Not Reportable   08/11/17  13:00    


 


[place] Bodies  Not Reportable   08/11/17  13:00    


 


Cabot Rings  Not Reportable   08/11/17  13:00    


 


Hasmukh Cells  Not Reportable   08/11/17  13:00    


 


Bite Cells  Not Reportable   08/11/17  13:00    


 


Crenated Cell  Not Reportable   08/11/17  13:00    


 


Elliptocytes  Not Reportable   08/11/17  13:00    


 


Acanthocytes (Spur)  Not Reportable   08/11/17  13:00    


 


Rouleaux  Not Reportable   08/11/17  13:00    


 


Hemoglobin C Crystals  Not Reportable   08/11/17  13:00    


 


Schistocytes  Not Reportable   08/11/17  13:00    


 


Malaria parasites  Not Reportable   08/11/17  13:00    


 


Thor Bodies  Not Reportable   08/11/17  13:00    


 


Hem Pathologist Commnt  No   08/11/17  13:00    


 


VBG pH  7.284  (7.320-7.420)  L  08/11/17  16:18    


 


Sodium  137 mmol/L (137-145)   08/15/17  03:59    


 


Potassium  4.1 mmol/L (3.6-5.0)   08/15/17  03:59    


 


Chloride  101.2 mmol/L ()   08/15/17  03:59    


 


Carbon Dioxide  22 mmol/L (22-30)   08/15/17  03:59    


 


Anion Gap  18 mmol/L  08/15/17  03:59    


 


BUN  13 mg/dL (7-17)   08/15/17  03:59    


 


Creatinine  0.7 mg/dL (0.7-1.2)   08/15/17  03:59    


 


Estimated GFR  > 60 ml/min  08/15/17  03:59    


 


BUN/Creatinine Ratio  18.57 %  08/15/17  03:59    


 


Glucose  103 mg/dL ()  H  08/15/17  03:59    


 


POC Glucose  162  ()  H  08/15/17  07:33    


 


Ketones Quantitative  Negative  (Negative)   08/11/17  13:54    


 


Lactic Acid  1.80 mmol/L (0.7-2.0)   08/14/17  03:44    


 


Calcium  8.0 mg/dL (8.4-10.2)  L  08/15/17  03:59    


 


Phosphorus  2.10 mg/dL (2.5-4.5)  L  08/11/17  22:30    


 


Magnesium  1.90 mg/dL (1.7-2.3)   08/11/17  22:30    


 


Total Bilirubin  0.60 mg/dL (0.1-1.2)   08/11/17  13:00    


 


AST  14 units/L (5-40)   08/11/17  13:00    


 


ALT  13 units/L (7-56)   08/11/17  13:00    


 


Alkaline Phosphatase  105 units/L ()   08/11/17  13:00    


 


C-Reactive Protein  22.40 mg/dL (0.00-1.30)  H  08/14/17  03:44    


 


Total Protein  6.9 g/dL (6.3-8.2)   08/11/17  13:00    


 


Albumin  2.7 g/dL (3.9-5)  L  08/11/17  13:00    


 


Albumin/Globulin Ratio  0.6 %  08/11/17  13:00    


 


Urine Color  Yellow  (Yellow)   08/13/17  Unknown


 


Urine Turbidity  Slightly-cloudy  (Clear)   08/13/17  Unknown


 


Urine pH  6.0  (5.0-7.0)   08/13/17  Unknown


 


Ur Specific Gravity  1.016  (1.003-1.030)   08/13/17  Unknown


 


Urine Protein  30 mg/dl mg/dL (Negative)   08/13/17  Unknown


 


Urine Glucose (UA)  >=500 mg/dL (Negative)   08/13/17  Unknown


 


Urine Ketones  Neg mg/dL (Negative)   08/13/17  Unknown


 


Urine Blood  Neg  (Negative)   08/13/17  Unknown


 


Urine Nitrite  Neg  (Negative)   08/13/17  Unknown


 


Urine Bilirubin  Neg  (Negative)   08/13/17  Unknown


 


Urine Urobilinogen  4.0 mg/dL (<2.0)   08/13/17  Unknown


 


Ur Leukocyte Esterase  Lg  (Negative)   08/13/17  Unknown


 


Urine WBC (Auto)  106.0 /HPF (0.0-6.0)  H  08/13/17  Unknown


 


Urine RBC (Auto)  2.0 /HPF (0.0-6.0)   08/13/17  Unknown


 


U Epithel Cells (Auto)  10.0 /HPF (0-13.0)   08/13/17  Unknown


 


Urine Bacteria (Auto)  1+ /HPF (Negative)   08/13/17  Unknown


 


Urine Mucus  Few /HPF  08/13/17  Unknown


 


Urine Osmolality  544 Mosm/kg  08/13/17  Unknown


 


Urine Creatinine  88.9 mg/dL (0.1-20.0)  H  08/13/17  Unknown


 


Urine Microalbumin  < 1.2 mg/dL (0.1-34.0)   08/13/17  Unknown


 


Microalb/Creat Ratio  13.4 ug/mg  08/13/17  Unknown


 


Urine Sodium  11 mEq/L  08/13/17  Unknown


 


Urine Total Protein  61 mg/dL (5-11.8)  H  08/13/17  Unknown


 


HIV 1&2 Antibody Rapid  Non react  (Non React)   08/13/17  03:51    


 


HIV P24 Antigen  Non react  (Non React)   08/13/17  03:51

## 2017-08-15 NOTE — PROGRESS NOTE
Assessment and Plan





- Patient Problems


(1) Cellulitis


Current Visit: Yes   Status: Acute   


Qualifiers: 


   Site of cellulitis: S   Site of cellulitis of extremity: S   Site of 

cellulitis of trunk: S   Laterality: L 





(2) Renal insufficiency


Current Visit: Yes   Status: Acute   





(3) Sepsis


Current Visit: Yes   Status: Acute   


Qualifiers: 


   Sepsis type: S 





(4) DKA (diabetic ketoacidoses)


Current Visit: Yes   Status: Acute   


Qualifiers: 


   Diabetes mellitus type: type 2   Diabetes mellitus complication detail: D 





(5) UTI (urinary tract infection)


Current Visit: Yes   Status: Acute   


Qualifiers: 


   Urinary tract infection type: acute cystitis   Hematuria presence: H   

Indwelling urinary catheter type: I   Encounter type: E 





(6) Discharge planning issues


Current Visit: Yes   Status: Acute   





Subjective


Date of service: 08/15/17


Principal diagnosis: Sepsis Syndrome; Cellulitis


Interval history: 





Seen and examined at bedside; 24 hour events reviewed; nursing and respiratory 

care staff consulted; no adverse overnight events reported to me;





Objective


 Vital Signs - 12hr











  08/14/17 08/14/17 08/15/17





  23:31 23:45 00:00


 


Temperature   


 


Pulse Rate 87 81 85


 


Pulse Rate [   77





Apical]   


 


Pulse Rate [   77





Left Dorsalis   





Pedis]   


 


Pulse Rate [   77





Left Radial]   


 


Pulse Rate [   77





Right Dorsalis   





Pedis]   


 


Pulse Rate [   77





Right Radial]   


 


Respiratory 19 16 14





Rate   


 


Blood Pressure 99/47 99/47 102/52


 


O2 Sat by Pulse 98 96 96





Oximetry   














  08/15/17 08/15/17 08/15/17





  00:08 00:15 00:31


 


Temperature 98 F  


 


Pulse Rate  85 83


 


Pulse Rate [   





Apical]   


 


Pulse Rate [   





Left Dorsalis   





Pedis]   


 


Pulse Rate [   





Left Radial]   


 


Pulse Rate [   





Right Dorsalis   





Pedis]   


 


Pulse Rate [   





Right Radial]   


 


Respiratory  16 16





Rate   


 


Blood Pressure  102/52 102/52


 


O2 Sat by Pulse  96 96





Oximetry   














  08/15/17 08/15/17 08/15/17





  00:45 01:00 01:15


 


Temperature   


 


Pulse Rate 82 93 H 76


 


Pulse Rate [   





Apical]   


 


Pulse Rate [   





Left Dorsalis   





Pedis]   


 


Pulse Rate [   





Left Radial]   


 


Pulse Rate [   





Right Dorsalis   





Pedis]   


 


Pulse Rate [   





Right Radial]   


 


Respiratory 14 11 L 17





Rate   


 


Blood Pressure 102/52 121/52 121/52


 


O2 Sat by Pulse 97 99 97





Oximetry   














  08/15/17 08/15/17 08/15/17





  01:31 01:45 02:00


 


Temperature   


 


Pulse Rate 74 86 74


 


Pulse Rate [   





Apical]   


 


Pulse Rate [   





Left Dorsalis   





Pedis]   


 


Pulse Rate [   





Left Radial]   


 


Pulse Rate [   





Right Dorsalis   





Pedis]   


 


Pulse Rate [   





Right Radial]   


 


Respiratory 18 22 16





Rate   


 


Blood Pressure 102/52 121/52 99/51


 


O2 Sat by Pulse 97 100 98





Oximetry   














  08/15/17 08/15/17 08/15/17





  02:15 02:31 02:45


 


Temperature   


 


Pulse Rate 76 82 76


 


Pulse Rate [   





Apical]   


 


Pulse Rate [   





Left Dorsalis   





Pedis]   


 


Pulse Rate [   





Left Radial]   


 


Pulse Rate [   





Right Dorsalis   





Pedis]   


 


Pulse Rate [   





Right Radial]   


 


Respiratory 17 18 16





Rate   


 


Blood Pressure 99/51 99/51 99/51


 


O2 Sat by Pulse 97 98 97





Oximetry   














  08/15/17 08/15/17 08/15/17





  03:01 03:15 03:31


 


Temperature   


 


Pulse Rate 89 75 75


 


Pulse Rate [   





Apical]   


 


Pulse Rate [   





Left Dorsalis   





Pedis]   


 


Pulse Rate [   





Left Radial]   


 


Pulse Rate [   





Right Dorsalis   





Pedis]   


 


Pulse Rate [   





Right Radial]   


 


Respiratory 13 13 15





Rate   


 


Blood Pressure 91/58 91/58 91/58


 


O2 Sat by Pulse 98 98 97





Oximetry   














  08/15/17 08/15/17 08/15/17





  03:45 04:00 04:15


 


Temperature  97.9 F 


 


Pulse Rate 78 85 74


 


Pulse Rate [   





Apical]   


 


Pulse Rate [   





Left Dorsalis   





Pedis]   


 


Pulse Rate [   





Left Radial]   


 


Pulse Rate [   





Right Dorsalis   





Pedis]   


 


Pulse Rate [   





Right Radial]   


 


Respiratory 13 16 16





Rate   


 


Blood Pressure 117/60 122/61 122/61


 


O2 Sat by Pulse 99 98 95





Oximetry   














  08/15/17 08/15/17 08/15/17





  04:31 04:45 05:00


 


Temperature   


 


Pulse Rate 73 75 78


 


Pulse Rate [   





Apical]   


 


Pulse Rate [   





Left Dorsalis   





Pedis]   


 


Pulse Rate [   





Left Radial]   


 


Pulse Rate [   





Right Dorsalis   





Pedis]   


 


Pulse Rate [   





Right Radial]   


 


Respiratory 26 H 14 13





Rate   


 


Blood Pressure 122/61 122/61 114/59


 


O2 Sat by Pulse 95 96 95





Oximetry   














  08/15/17 08/15/17 08/15/17





  05:15 05:31 05:53


 


Temperature   


 


Pulse Rate 74 87 77


 


Pulse Rate [   





Apical]   


 


Pulse Rate [   





Left Dorsalis   





Pedis]   


 


Pulse Rate [   





Left Radial]   


 


Pulse Rate [   





Right Dorsalis   





Pedis]   


 


Pulse Rate [   





Right Radial]   


 


Respiratory 14 18 





Rate   


 


Blood Pressure 114/59 114/59 114/59


 


O2 Sat by Pulse 95 96 





Oximetry   














  08/15/17 08/15/17 08/15/17





  06:00 06:15 06:31


 


Temperature   


 


Pulse Rate 77 73 69


 


Pulse Rate [   





Apical]   


 


Pulse Rate [   





Left Dorsalis   





Pedis]   


 


Pulse Rate [   





Left Radial]   


 


Pulse Rate [   





Right Dorsalis   





Pedis]   


 


Pulse Rate [   





Right Radial]   


 


Respiratory 15 16 15





Rate   


 


Blood Pressure 121/59 121/59 114/59


 


O2 Sat by Pulse 98 97 95





Oximetry   














  08/15/17 08/15/17 08/15/17





  06:45 07:00 07:15


 


Temperature   


 


Pulse Rate 91 H 80 81


 


Pulse Rate [   





Apical]   


 


Pulse Rate [   





Left Dorsalis   





Pedis]   


 


Pulse Rate [   





Left Radial]   


 


Pulse Rate [   





Right Dorsalis   





Pedis]   


 


Pulse Rate [   





Right Radial]   


 


Respiratory 15 14 16





Rate   


 


Blood Pressure 114/59 120/55 120/55


 


O2 Sat by Pulse 99 98 96





Oximetry   














  08/15/17 08/15/17 08/15/17





  07:31 07:45 08:00


 


Temperature   


 


Pulse Rate 95 H 92 H 84


 


Pulse Rate [   





Apical]   


 


Pulse Rate [   





Left Dorsalis   





Pedis]   


 


Pulse Rate [   





Left Radial]   


 


Pulse Rate [   





Right Dorsalis   





Pedis]   


 


Pulse Rate [   





Right Radial]   


 


Respiratory 15 15 12





Rate   


 


Blood Pressure 120/55 120/55 100/50


 


O2 Sat by Pulse 100 99 99





Oximetry   














  08/15/17 08/15/17





  08:15 10:14


 


Temperature  


 


Pulse Rate 92 H 79


 


Pulse Rate [  





Apical]  


 


Pulse Rate [  





Left Dorsalis  





Pedis]  


 


Pulse Rate [  





Left Radial]  


 


Pulse Rate [  





Right Dorsalis  





Pedis]  


 


Pulse Rate [  





Right Radial]  


 


Respiratory 13 





Rate  


 


Blood Pressure 100/50 118/62


 


O2 Sat by Pulse 94 





Oximetry  











Constitutional: no acute distress, alert


Eyes: non-icteric


ENT: oropharynx moist


Neck: supple, no lymphadenopathy


Effort: mildly labored


Ascultation: Bilateral: clear


Cardiovascular: regular rate and rhythm


Gastrointestinal: normoactive bowel sounds, soft, non-tender, non-distended


Integumentary: cellulitis, other (erythema to right upper back and axillary 

region; no graining lesion)


Extremities: no cyanosis, no edema, pulses normal, no ischemia or petechiae


Neurologic: normal mental status, non-focal exam, pupils equal and round, motor 

strength normal and


Psychiatric: mood appropriate, affect normal


CBC and BMP: 


 08/15/17 03:59





 08/15/17 03:59


Abnormal lab findings: 


 Abnormal Labs











  08/11/17 08/11/17 08/11/17





  16:18 16:18 16:39


 


WBC   


 


RBC   


 


Lymph % (Auto)   


 


Lymph #   


 


Seg Neutrophils %   


 


Seg Neutrophils #   


 


VBG pH  7.284 L  


 


Sodium   126 L D 


 


Chloride   87.1 L 


 


Carbon Dioxide   21 L 


 


BUN   31 H 


 


Creatinine   


 


Glucose   410 H 


 


POC Glucose    334 H


 


Lactic Acid   


 


Calcium   8.0 L 


 


Phosphorus   


 


C-Reactive Protein   


 


Urine WBC (Auto)   


 


Urine Creatinine   


 


Urine Total Protein   














  08/11/17 08/11/17 08/11/17





  18:02 19:08 19:38


 


WBC   


 


RBC   


 


Lymph % (Auto)   


 


Lymph #   


 


Seg Neutrophils %   


 


Seg Neutrophils #   


 


VBG pH   


 


Sodium    130 L


 


Chloride    94.1 L


 


Carbon Dioxide   


 


BUN    34 H


 


Creatinine    1.3 H


 


Glucose    223 H


 


POC Glucose  364 H  313 H 


 


Lactic Acid   


 


Calcium    7.9 L


 


Phosphorus   


 


C-Reactive Protein   


 


Urine WBC (Auto)   


 


Urine Creatinine   


 


Urine Total Protein   














  08/11/17 08/11/17 08/11/17





  20:07 21:00 21:47


 


WBC   


 


RBC   


 


Lymph % (Auto)   


 


Lymph #   


 


Seg Neutrophils %   


 


Seg Neutrophils #   


 


VBG pH   


 


Sodium   


 


Chloride   


 


Carbon Dioxide   


 


BUN   


 


Creatinine   


 


Glucose   


 


POC Glucose  266 H  171 H  132 H


 


Lactic Acid   


 


Calcium   


 


Phosphorus   


 


C-Reactive Protein   


 


Urine WBC (Auto)   


 


Urine Creatinine   


 


Urine Total Protein   














  08/11/17 08/11/17 08/11/17





  22:30 23:49 Unknown


 


WBC   


 


RBC   


 


Lymph % (Auto)   


 


Lymph #   


 


Seg Neutrophils %   


 


Seg Neutrophils #   


 


VBG pH   


 


Sodium  129 L  129 L 


 


Chloride  94.2 L  93.5 L 


 


Carbon Dioxide   


 


BUN  34 H  34 H 


 


Creatinine   


 


Glucose   104 H 


 


POC Glucose   


 


Lactic Acid   


 


Calcium  8.0 L  7.8 L 


 


Phosphorus  2.10 L  


 


C-Reactive Protein   


 


Urine WBC (Auto)    > 182.0 H


 


Urine Creatinine   


 


Urine Total Protein   














  08/12/17 08/12/17 08/12/17





  01:11 01:19 02:00


 


WBC   


 


RBC   


 


Lymph % (Auto)   


 


Lymph #   


 


Seg Neutrophils %   


 


Seg Neutrophils #   


 


VBG pH   


 


Sodium  124 L  


 


Chloride  92.3 L  


 


Carbon Dioxide  16 L  


 


BUN  34 H  


 


Creatinine   


 


Glucose  103 H  


 


POC Glucose   124 H  111 H


 


Lactic Acid   


 


Calcium  7.9 L  


 


Phosphorus   


 


C-Reactive Protein   


 


Urine WBC (Auto)   


 


Urine Creatinine   


 


Urine Total Protein   














  08/12/17 08/12/17 08/12/17





  02:25 03:06 04:17


 


WBC   


 


RBC   


 


Lymph % (Auto)   


 


Lymph #   


 


Seg Neutrophils %   


 


Seg Neutrophils #   


 


VBG pH   


 


Sodium  128 L  


 


Chloride  94.0 L  


 


Carbon Dioxide  19 L  


 


BUN  34 H  


 


Creatinine   


 


Glucose  136 H  


 


POC Glucose   164 H  181 H


 


Lactic Acid   


 


Calcium  8.1 L  


 


Phosphorus   


 


C-Reactive Protein   


 


Urine WBC (Auto)   


 


Urine Creatinine   


 


Urine Total Protein   














  08/12/17 08/12/17 08/12/17





  04:53 05:32 05:33


 


WBC   


 


RBC   


 


Lymph % (Auto)   


 


Lymph #   


 


Seg Neutrophils %   


 


Seg Neutrophils #   


 


VBG pH   


 


Sodium    129 L


 


Chloride    95.3 L


 


Carbon Dioxide    17 L


 


BUN    36 H


 


Creatinine   


 


Glucose    155 H


 


POC Glucose  184 H  171 H 


 


Lactic Acid   


 


Calcium    7.7 L


 


Phosphorus   


 


C-Reactive Protein   


 


Urine WBC (Auto)   


 


Urine Creatinine   


 


Urine Total Protein   














  08/12/17 08/12/17 08/12/17





  07:56 08:20 09:04


 


WBC   


 


RBC   


 


Lymph % (Auto)   


 


Lymph #   


 


Seg Neutrophils %   


 


Seg Neutrophils #   


 


VBG pH   


 


Sodium   129 L 


 


Chloride   96.0 L 


 


Carbon Dioxide   18 L 


 


BUN   33 H 


 


Creatinine   


 


Glucose   124 H 


 


POC Glucose  131 H   126 H


 


Lactic Acid   


 


Calcium   7.9 L 


 


Phosphorus   


 


C-Reactive Protein   


 


Urine WBC (Auto)   


 


Urine Creatinine   


 


Urine Total Protein   














  08/12/17 08/12/17 08/12/17





  09:58 12:13 12:59


 


WBC   


 


RBC   


 


Lymph % (Auto)   


 


Lymph #   


 


Seg Neutrophils %   


 


Seg Neutrophils #   


 


VBG pH   


 


Sodium    128 L


 


Chloride    94.6 L


 


Carbon Dioxide    17 L


 


BUN    32 H


 


Creatinine   


 


Glucose    292 H


 


POC Glucose  142 H  295 H 


 


Lactic Acid   


 


Calcium    8.0 L


 


Phosphorus   


 


C-Reactive Protein   


 


Urine WBC (Auto)   


 


Urine Creatinine   


 


Urine Total Protein   














  08/12/17 08/12/17 08/12/17





  14:43 14:43 15:57


 


WBC   


 


RBC   


 


Lymph % (Auto)   


 


Lymph #   


 


Seg Neutrophils %   


 


Seg Neutrophils #   


 


VBG pH   


 


Sodium   


 


Chloride   


 


Carbon Dioxide   


 


BUN   


 


Creatinine   


 


Glucose   


 


POC Glucose    431 H


 


Lactic Acid  2.90 H*  


 


Calcium   


 


Phosphorus   


 


C-Reactive Protein   34.10 H 


 


Urine WBC (Auto)   


 


Urine Creatinine   


 


Urine Total Protein   














  08/12/17 08/12/17 08/13/17





  20:51 21:13 03:51


 


WBC   


 


RBC   


 


Lymph % (Auto)   


 


Lymph #   


 


Seg Neutrophils %   


 


Seg Neutrophils #   


 


VBG pH   


 


Sodium  130 L   130 L


 


Chloride  94.9 L   94.4 L


 


Carbon Dioxide  21 L   20 L


 


BUN  33 H   31 H


 


Creatinine   


 


Glucose  217 H   277 H


 


POC Glucose   241 H 


 


Lactic Acid   


 


Calcium  7.7 L   8.2 L


 


Phosphorus   


 


C-Reactive Protein   


 


Urine WBC (Auto)   


 


Urine Creatinine   


 


Urine Total Protein   














  08/13/17 08/13/17 08/13/17





  07:22 11:31 11:34


 


WBC    15.4 H


 


RBC   


 


Lymph % (Auto)    5.2 L


 


Lymph #    0.8 L


 


Seg Neutrophils %    89.7 H


 


Seg Neutrophils #    13.8 H


 


VBG pH   


 


Sodium   


 


Chloride   


 


Carbon Dioxide   


 


BUN   


 


Creatinine   


 


Glucose   


 


POC Glucose  264 H  273 H 


 


Lactic Acid   


 


Calcium   


 


Phosphorus   


 


C-Reactive Protein   


 


Urine WBC (Auto)   


 


Urine Creatinine   


 


Urine Total Protein   














  08/13/17 08/13/17 08/13/17





  16:31 21:49 Unknown


 


WBC   


 


RBC   


 


Lymph % (Auto)   


 


Lymph #   


 


Seg Neutrophils %   


 


Seg Neutrophils #   


 


VBG pH   


 


Sodium   


 


Chloride   


 


Carbon Dioxide   


 


BUN   


 


Creatinine   


 


Glucose   


 


POC Glucose  328 H  264 H 


 


Lactic Acid   


 


Calcium   


 


Phosphorus   


 


C-Reactive Protein   


 


Urine WBC (Auto)    106.0 H


 


Urine Creatinine   


 


Urine Total Protein   














  08/13/17 08/14/17 08/14/17





  Unknown 03:44 03:44


 


WBC   11.7 H 


 


RBC   


 


Lymph % (Auto)   6.7 L 


 


Lymph #   0.8 L 


 


Seg Neutrophils %   88.1 H 


 


Seg Neutrophils #   10.3 H 


 


VBG pH   


 


Sodium    131 L


 


Chloride   


 


Carbon Dioxide    20 L


 


BUN    19 H


 


Creatinine   


 


Glucose    192 H


 


POC Glucose   


 


Lactic Acid   


 


Calcium    7.8 L


 


Phosphorus   


 


C-Reactive Protein    22.40 H


 


Urine WBC (Auto)   


 


Urine Creatinine  88.9 H  


 


Urine Total Protein  61 H  














  08/14/17 08/14/17 08/14/17





  07:41 11:05 16:41


 


WBC   


 


RBC   


 


Lymph % (Auto)   


 


Lymph #   


 


Seg Neutrophils %   


 


Seg Neutrophils #   


 


VBG pH   


 


Sodium   


 


Chloride   


 


Carbon Dioxide   


 


BUN   


 


Creatinine   


 


Glucose   


 


POC Glucose  197 H  151 H  223 H


 


Lactic Acid   


 


Calcium   


 


Phosphorus   


 


C-Reactive Protein   


 


Urine WBC (Auto)   


 


Urine Creatinine   


 


Urine Total Protein   














  08/14/17 08/15/17 08/15/17





  21:48 03:59 03:59


 


WBC   


 


RBC   3.60 L 


 


Lymph % (Auto)   8.4 L 


 


Lymph #   0.8 L 


 


Seg Neutrophils %   83.8 H 


 


Seg Neutrophils #   8.0 H 


 


VBG pH   


 


Sodium   


 


Chloride   


 


Carbon Dioxide   


 


BUN   


 


Creatinine   


 


Glucose    103 H


 


POC Glucose  240 H  


 


Lactic Acid   


 


Calcium    8.0 L


 


Phosphorus   


 


C-Reactive Protein   


 


Urine WBC (Auto)   


 


Urine Creatinine   


 


Urine Total Protein   














  08/15/17





  07:33


 


WBC 


 


RBC 


 


Lymph % (Auto) 


 


Lymph # 


 


Seg Neutrophils % 


 


Seg Neutrophils # 


 


VBG pH 


 


Sodium 


 


Chloride 


 


Carbon Dioxide 


 


BUN 


 


Creatinine 


 


Glucose 


 


POC Glucose  162 H


 


Lactic Acid 


 


Calcium 


 


Phosphorus 


 


C-Reactive Protein 


 


Urine WBC (Auto) 


 


Urine Creatinine 


 


Urine Total Protein

## 2017-08-15 NOTE — PROGRESS NOTE
Assessment and Plan





- Patient Problems


(1) Acute kidney injury


Current Visit: Yes   Status: Acute   


Plan to address problem: 


Prerenal azotemia secondary to volume depletion secondary to osmotic diuresis.  

Kidney function has improved back to normal with volume repletion.  Sign off.  

Reconsult if needed








(2) Hyponatremia


Current Visit: Yes   Status: Acute   


Plan to address problem: 


Factitious hyponatremia secondary to hyperglycemia and true hyponatremia 

secondary to volume depletion.  Improved with blood sugar control and volume 

repletion.  Sodium is back to normal.








(3) Type 2 diabetes mellitus with hyperosmolar nonketotic hyperglycemia


Current Visit: Yes   Status: Acute   


Plan to address problem: 


Blood sugar control improved though still suboptimal.  Continue management by 

primary attending








(4) UTI (urinary tract infection)


Current Visit: Yes   Status: Acute   


Qualifiers: 


   Urinary tract infection type: acute cystitis   Hematuria presence: H   

Indwelling urinary catheter type: I   Encounter type: E 


Plan to address problem: 


Escherichia coli sensitive to Cipro/Bactrim.  Continue antibiotics








(5) Essential (primary) hypertension


Current Visit: Yes   Status: Acute   


Plan to address problem: 


Blood Pressure is controlled on current medications.  Continue same








(6) Sepsis


Current Visit: Yes   Status: Acute   


Qualifiers: 


   Sepsis type: S 


Plan to address problem: 


Off of Levophed.  Continue antibiotics per ID.  








(7) Herpes zoster


Current Visit: Yes   Status: Acute   


Qualifiers: 


   Herpes zoster complications: without complications   Herpes zoster 

neurologic complication detail: H   Herpes zoster ocular complication detail: H

   Qualified Code(s): B02.9 - Zoster without complications   


Plan to address problem: 


On acyclovir








Subjective


Date of service: 08/15/17


Principal diagnosis: Sepsis Syndrome; Cellulitis


Interval history: 





Patient seen lying in bed. Still C/o Pain right side. Feels much better





Objective





- Exam


Narrative Exam: 





[Middle-aged  female lying in bed] in no acute distress


HEENT [normocephalic atraumatic, pupils equal reactive to light, pink, clear 

oropharynx]


Neck [supple, no thyromegaly no jugular venous distention]


CVS [S1-S2 regular rate rhythm without murmur, rub or gallop]


Chest [clear to auscultation]


Abdomen [soft nondistended nontender no organomegaly no bruit bowel sounds 

present]


Extremities [no edema no cyanosis or clubbing]


Neuro [awake, alert oriented x3 no gross deficit]








- Vital Signs


Vital signs: 


 Vital Signs - 12hr











  08/14/17 08/14/17 08/14/17





  19:45 20:00 20:15


 


Temperature   


 


Pulse Rate 87 82 83


 


Pulse Rate [  75 





Apical]   


 


Pulse Rate [  75 





From Monitor]   


 


Pulse Rate [  75 





Left Dorsalis   





Pedis]   


 


Pulse Rate [  75 





Left Radial]   


 


Pulse Rate [  75 





Right Dorsalis   





Pedis]   


 


Pulse Rate [  75 





Right Radial]   


 


Respiratory 20 27 H 15





Rate   


 


Blood Pressure 113/45 100/57 113/45


 


O2 Sat by Pulse 98 100 96





Oximetry   














  08/14/17 08/14/17 08/14/17





  20:31 20:35 20:45


 


Temperature  98.2 F 


 


Pulse Rate 85  91 H


 


Pulse Rate [   





Apical]   


 


Pulse Rate [   





From Monitor]   


 


Pulse Rate [   





Left Dorsalis   





Pedis]   


 


Pulse Rate [   





Left Radial]   


 


Pulse Rate [   





Right Dorsalis   





Pedis]   


 


Pulse Rate [   





Right Radial]   


 


Respiratory 11 L  15





Rate   


 


Blood Pressure 113/45  113/45


 


O2 Sat by Pulse 96  98





Oximetry   














  08/14/17 08/14/17 08/14/17





  21:00 21:15 21:31


 


Temperature   


 


Pulse Rate 83 81 83


 


Pulse Rate [   





Apical]   


 


Pulse Rate [   





From Monitor]   


 


Pulse Rate [   





Left Dorsalis   





Pedis]   


 


Pulse Rate [   





Left Radial]   


 


Pulse Rate [   





Right Dorsalis   





Pedis]   


 


Pulse Rate [   





Right Radial]   


 


Respiratory 22 16 9 L





Rate   


 


Blood Pressure 100/54 100/54 100/54


 


O2 Sat by Pulse 96 95 97





Oximetry   














  08/14/17 08/14/17 08/14/17





  21:45 22:01 22:15


 


Temperature   


 


Pulse Rate 84 82 107 H


 


Pulse Rate [   





Apical]   


 


Pulse Rate [   





From Monitor]   


 


Pulse Rate [   





Left Dorsalis   





Pedis]   


 


Pulse Rate [   





Left Radial]   


 


Pulse Rate [   





Right Dorsalis   





Pedis]   


 


Pulse Rate [   





Right Radial]   


 


Respiratory 12 21 15





Rate   


 


Blood Pressure 100/54 100/54 100/54


 


O2 Sat by Pulse 98  





Oximetry   














  08/14/17 08/14/17 08/14/17





  22:31 22:37 22:45


 


Temperature   


 


Pulse Rate 94 H 80 77


 


Pulse Rate [   





Apical]   


 


Pulse Rate [   





From Monitor]   


 


Pulse Rate [   





Left Dorsalis   





Pedis]   


 


Pulse Rate [   





Left Radial]   


 


Pulse Rate [   





Right Dorsalis   





Pedis]   


 


Pulse Rate [   





Right Radial]   


 


Respiratory 17 21 18





Rate   


 


Blood Pressure 130/60 130/60 130/60


 


O2 Sat by Pulse  98 96





Oximetry   














  08/14/17 08/14/17 08/14/17





  23:00 23:15 23:16


 


Temperature   


 


Pulse Rate 82 89 


 


Pulse Rate [   





Apical]   


 


Pulse Rate [   





From Monitor]   


 


Pulse Rate [   





Left Dorsalis   





Pedis]   


 


Pulse Rate [   





Left Radial]   


 


Pulse Rate [   





Right Dorsalis   





Pedis]   


 


Pulse Rate [   





Right Radial]   


 


Respiratory 18 16 21





Rate   


 


Blood Pressure 99/47 99/47 


 


O2 Sat by Pulse 96 97 





Oximetry   














  08/14/17 08/14/17 08/15/17





  23:31 23:45 00:00


 


Temperature   


 


Pulse Rate 87 81 85


 


Pulse Rate [   77





Apical]   


 


Pulse Rate [   





From Monitor]   


 


Pulse Rate [   77





Left Dorsalis   





Pedis]   


 


Pulse Rate [   77





Left Radial]   


 


Pulse Rate [   77





Right Dorsalis   





Pedis]   


 


Pulse Rate [   77





Right Radial]   


 


Respiratory 19 16 14





Rate   


 


Blood Pressure 99/47 99/47 102/52


 


O2 Sat by Pulse 98 96 96





Oximetry   














  08/15/17 08/15/17 08/15/17





  00:08 00:15 00:31


 


Temperature 98 F  


 


Pulse Rate  85 83


 


Pulse Rate [   





Apical]   


 


Pulse Rate [   





From Monitor]   


 


Pulse Rate [   





Left Dorsalis   





Pedis]   


 


Pulse Rate [   





Left Radial]   


 


Pulse Rate [   





Right Dorsalis   





Pedis]   


 


Pulse Rate [   





Right Radial]   


 


Respiratory  16 16





Rate   


 


Blood Pressure  102/52 102/52


 


O2 Sat by Pulse  96 96





Oximetry   














  08/15/17 08/15/17 08/15/17





  00:45 01:00 01:15


 


Temperature   


 


Pulse Rate 82 93 H 76


 


Pulse Rate [   





Apical]   


 


Pulse Rate [   





From Monitor]   


 


Pulse Rate [   





Left Dorsalis   





Pedis]   


 


Pulse Rate [   





Left Radial]   


 


Pulse Rate [   





Right Dorsalis   





Pedis]   


 


Pulse Rate [   





Right Radial]   


 


Respiratory 14 11 L 17





Rate   


 


Blood Pressure 102/52 121/52 121/52


 


O2 Sat by Pulse 97 99 97





Oximetry   














  08/15/17 08/15/17 08/15/17





  01:31 01:45 02:00


 


Temperature   


 


Pulse Rate 74 86 74


 


Pulse Rate [   





Apical]   


 


Pulse Rate [   





From Monitor]   


 


Pulse Rate [   





Left Dorsalis   





Pedis]   


 


Pulse Rate [   





Left Radial]   


 


Pulse Rate [   





Right Dorsalis   





Pedis]   


 


Pulse Rate [   





Right Radial]   


 


Respiratory 18 22 16





Rate   


 


Blood Pressure 102/52 121/52 99/51


 


O2 Sat by Pulse 97 100 98





Oximetry   














  08/15/17 08/15/17 08/15/17





  02:15 02:31 02:45


 


Temperature   


 


Pulse Rate 76 82 76


 


Pulse Rate [   





Apical]   


 


Pulse Rate [   





From Monitor]   


 


Pulse Rate [   





Left Dorsalis   





Pedis]   


 


Pulse Rate [   





Left Radial]   


 


Pulse Rate [   





Right Dorsalis   





Pedis]   


 


Pulse Rate [   





Right Radial]   


 


Respiratory 17 18 16





Rate   


 


Blood Pressure 99/51 99/51 99/51


 


O2 Sat by Pulse 97 98 97





Oximetry   














  08/15/17 08/15/17 08/15/17





  03:01 03:15 03:31


 


Temperature   


 


Pulse Rate 89 75 75


 


Pulse Rate [   





Apical]   


 


Pulse Rate [   





From Monitor]   


 


Pulse Rate [   





Left Dorsalis   





Pedis]   


 


Pulse Rate [   





Left Radial]   


 


Pulse Rate [   





Right Dorsalis   





Pedis]   


 


Pulse Rate [   





Right Radial]   


 


Respiratory 13 13 15





Rate   


 


Blood Pressure 91/58 91/58 91/58


 


O2 Sat by Pulse 98 98 97





Oximetry   














  08/15/17 08/15/17 08/15/17





  03:45 04:00 04:15


 


Temperature  97.9 F 


 


Pulse Rate 78 85 74


 


Pulse Rate [   





Apical]   


 


Pulse Rate [   





From Monitor]   


 


Pulse Rate [   





Left Dorsalis   





Pedis]   


 


Pulse Rate [   





Left Radial]   


 


Pulse Rate [   





Right Dorsalis   





Pedis]   


 


Pulse Rate [   





Right Radial]   


 


Respiratory 13 16 16





Rate   


 


Blood Pressure 117/60 122/61 122/61


 


O2 Sat by Pulse 99 98 95





Oximetry   














  08/15/17 08/15/17 08/15/17





  04:31 04:45 05:00


 


Temperature   


 


Pulse Rate 73 75 78


 


Pulse Rate [   





Apical]   


 


Pulse Rate [   





From Monitor]   


 


Pulse Rate [   





Left Dorsalis   





Pedis]   


 


Pulse Rate [   





Left Radial]   


 


Pulse Rate [   





Right Dorsalis   





Pedis]   


 


Pulse Rate [   





Right Radial]   


 


Respiratory 26 H 14 13





Rate   


 


Blood Pressure 122/61 122/61 114/59


 


O2 Sat by Pulse 95 96 95





Oximetry   














  08/15/17 08/15/17





  05:15 05:31


 


Temperature  


 


Pulse Rate 74 87


 


Pulse Rate [  





Apical]  


 


Pulse Rate [  





From Monitor]  


 


Pulse Rate [  





Left Dorsalis  





Pedis]  


 


Pulse Rate [  





Left Radial]  


 


Pulse Rate [  





Right Dorsalis  





Pedis]  


 


Pulse Rate [  





Right Radial]  


 


Respiratory 14 18





Rate  


 


Blood Pressure 114/59 114/59


 


O2 Sat by Pulse 95 96





Oximetry  














- Lab





 08/15/17 03:59





 08/15/17 03:59


 Most recent lab results











Calcium  8.0 mg/dL (8.4-10.2)  L  08/15/17  03:59    


 


Phosphorus  2.10 mg/dL (2.5-4.5)  L  08/11/17  22:30    


 


Magnesium  1.90 mg/dL (1.7-2.3)   08/11/17  22:30    


 


Urine Creatinine  88.9 mg/dL (0.1-20.0)  H  08/13/17  Unknown


 


Urine Sodium  11 mEq/L  08/13/17  Unknown


 


Urine Total Protein  61 mg/dL (5-11.8)  H  08/13/17  Unknown

## 2017-08-16 VITALS — DIASTOLIC BLOOD PRESSURE: 54 MMHG | SYSTOLIC BLOOD PRESSURE: 116 MMHG

## 2017-08-16 LAB
ANION GAP SERPL CALC-SCNC: 14 MMOL/L
BASOPHILS NFR BLD AUTO: 0.4 % (ref 0–1.8)
BUN SERPL-MCNC: 8 MG/DL (ref 7–17)
BUN/CREAT SERPL: 11.42 %
CALCIUM SERPL-MCNC: 7.6 MG/DL (ref 8.4–10.2)
CHLORIDE SERPL-SCNC: 98.9 MMOL/L (ref 98–107)
CO2 SERPL-SCNC: 24 MMOL/L (ref 22–30)
EOSINOPHIL NFR BLD AUTO: 0.8 % (ref 0–4.3)
GLUCOSE SERPL-MCNC: 104 MG/DL (ref 65–100)
HCT VFR BLD CALC: 31.9 % (ref 30.3–42.9)
HGB BLD-MCNC: 11.1 GM/DL (ref 10.1–14.3)
MCH RBC QN AUTO: 29 PG (ref 28–32)
MCHC RBC AUTO-ENTMCNC: 35 % (ref 30–34)
MCV RBC AUTO: 84 FL (ref 79–97)
PLATELET # BLD: 279 K/MM3 (ref 140–440)
POTASSIUM SERPL-SCNC: 3.3 MMOL/L (ref 3.6–5)
RBC # BLD AUTO: 3.8 M/MM3 (ref 3.65–5.03)
SODIUM SERPL-SCNC: 134 MMOL/L (ref 137–145)
WBC # BLD AUTO: 10.9 K/MM3 (ref 4.5–11)

## 2017-08-16 RX ADMIN — INSULIN ASPART SCH: 100 INJECTION, SOLUTION INTRAVENOUS; SUBCUTANEOUS at 08:55

## 2017-08-16 RX ADMIN — ACYCLOVIR SCH MG: 800 TABLET ORAL at 10:30

## 2017-08-16 RX ADMIN — INSULIN ASPART SCH: 100 INJECTION, SOLUTION INTRAVENOUS; SUBCUTANEOUS at 12:38

## 2017-08-16 RX ADMIN — HYDROCODONE BITARTRATE AND ACETAMINOPHEN PRN EACH: 5; 325 TABLET ORAL at 10:45

## 2017-08-16 RX ADMIN — ENOXAPARIN SODIUM SCH MG: 100 INJECTION SUBCUTANEOUS at 10:30

## 2017-08-16 RX ADMIN — VANCOMYCIN HYDROCHLORIDE SCH MLS/HR: 100 INJECTION, POWDER, LYOPHILIZED, FOR SOLUTION INTRAVENOUS at 10:48

## 2017-08-16 RX ADMIN — HYDROMORPHONE HYDROCHLORIDE PRN MG: 1 INJECTION, SOLUTION INTRAMUSCULAR; INTRAVENOUS; SUBCUTANEOUS at 00:33

## 2017-08-16 RX ADMIN — ESCITALOPRAM OXALATE SCH MG: 10 TABLET ORAL at 10:29

## 2017-08-16 RX ADMIN — PAROXETINE HYDROCHLORIDE SCH MG: 12.5 TABLET, FILM COATED, EXTENDED RELEASE ORAL at 11:58

## 2017-08-16 RX ADMIN — MIDODRINE HYDROCHLORIDE SCH MG: 5 TABLET ORAL at 06:12

## 2017-08-16 RX ADMIN — FAMOTIDINE SCH MG: 20 TABLET ORAL at 10:29

## 2017-08-16 RX ADMIN — LEVOTHYROXINE SODIUM SCH MCG: 0.05 TABLET ORAL at 06:12

## 2017-08-16 NOTE — DISCHARGE SUMMARY
Providers





- Providers


Date of Admission: 


08/11/17 15:28





Date of discharge: 08/16/17


Attending physician: 


MONICA CASTANO





 





08/11/17 21:15


Consult to Dietitian/Nutrition [CONS] Routine 


   Physician Instructions: 


   Reason For Exam: DKA


   Reason for Consult: Nutrition Recommendations


   Reason for Consult: Diet education





08/12/17 12:35


Consult to Physician [CONS] Routine 


   Consulting Provider: KIRK FOX


   Reason For Exam: right flank abscess


   Place consult to:: Kimberly


   Notified:: yes


   Was contact made?: Yes


   If yes, spoke with:: Kimberly


   Time called:: 14:30


   Comment:: on unit





08/12/17 15:45


Consult to Physician [CONS] Urgent 


   Consulting Provider: ALLY MARQUEZ


   Reason For Exam: rt flank,rt axcillary,labia, lt thigh abcess


   Place consult to:: charles


   Notified:: yes


   Phone number called:: [phone]


   Was contact made?: Yes


   If yes, spoke with:: charles


   Time called:: 15:48











Primary care physician: 


PRIMARY CARE MD








Hospitalization


Reason for admission: abscess


Condition: Good


Hospital course: 








Ms. Lorenz is a 57-year-old woman admitted with DKA, septic shock, acute renal 

failure, multiple abscesses and urinary tract infection.  She has a right 

axillary lesion which was first noticed 4 days ago prior to admission. She had 

a right labial lesion, which has been present "for more than 10 years." She 

also had pain along her right flank with associated redness.  Patient was 

treated with IV insulin and aggressive IV fluid hydration for the DKA.  The 

patient's blood sugar stabilized and she was then transitioned to long-acting 

insulin.  Patient received IV antibiotics for the UTI and multiple abscesses.  

Patient was found to have furunculosis and was seen by ID and consultation.  

The patient also appeared to have herpes zoster and was treated with acyclovir.

  Surgery also evaluated the abscesses but felt no need for any surgical 

intervention.  Patient initially required pressors that were later weaned off 

and patient was later transferred to the floor.  With regards to the acute 

renal failure, etiology likely secondary to sepsis/ATN/BRANDY/volume depletion.  

The patient was seen by nephrology consultation.  The creatinine returned to 

normal range.  The patient is felt to have received maximal hospital benefit.  

The IV vancomycin therapy will be discontinued and patient will be discharged 

with Bactrim and clindamycin.  Patient also received prescription for 

acyclovir.  Dedicated discharge time 35 minutes.


Disposition: [id number]-01 TO HOME OR SELFCARE


Time spent for discharge: 35





- Discharge Diagnoses


(1) Abscess


Status: Acute   





(2) Acute kidney injury


Status: Acute   





(3) Acute renal failure


Status: Acute   


Qualifiers: 


   Acute renal failure type: unspecified   Qualified Code(s): N17.9 - Acute 

kidney failure, unspecified   





(4) Cellulitis


Status: Acute   


Qualifiers: 


   Site of cellulitis: S   Site of cellulitis of extremity: S   Site of 

cellulitis of trunk: S   Laterality: L 





(5) DKA (diabetic ketoacidoses)


Status: Acute   


Qualifiers: 


   Diabetes mellitus type: type 2   Diabetes mellitus complication detail: D 





(6) Essential (primary) hypertension


Status: Acute   





(7) Herpes zoster


Status: Acute   


Qualifiers: 


   Herpes zoster complications: without complications   Herpes zoster 

neurologic complication detail: H   Herpes zoster ocular complication detail: H

   Qualified Code(s): B02.9 - Zoster without complications   





(8) Sepsis


Status: Acute   


Qualifiers: 


   Sepsis type: S 





(9) Sepsis affecting skin


Status: Acute   





(10) Type 2 diabetes mellitus with hyperosmolar nonketotic hyperglycemia


Status: Acute   





(11) UTI (urinary tract infection)


Status: Acute   


Qualifiers: 


   Urinary tract infection type: acute cystitis   Hematuria presence: H   

Indwelling urinary catheter type: I   Encounter type: E 





(12) Septic shock


Status: Acute   





Core Measure Documentation





- Palliative Care


Palliative Care/ Comfort Measures: Not Applicable





- Core Measures


Any of the following diagnoses?: none





Exam





- Constitutional


Vitals: 


 











Temp Pulse Resp BP Pulse Ox


 


 98.3 F   74   18   133/62   96 


 


 08/16/17 08:00  08/16/17 08:00  08/16/17 08:00  08/16/17 08:00  08/16/17 08:00











General appearance: Present: no acute distress, well-nourished





- EENT


Eyes: Present: PERRL


ENT: hearing intact, clear oral mucosa





- Neck


Neck: Present: supple, normal ROM





- Respiratory


Respiratory effort: normal


Respiratory: bilateral: CTA





- Cardiovascular


Heart Sounds: Present: S1 & S2.  Absent: rub, click





- Extremities


Extremities: pulses symmetrical, No edema


Peripheral Pulses: within normal limits





- Abdominal


General gastrointestinal: Present: soft, non-tender, non-distended, normal 

bowel sounds


Female genitourinary: Present: normal





- Integumentary


Integumentary: Present: clear, warm, dry





- Musculoskeletal


Musculoskeletal: gait normal, strength equal bilaterally





- Psychiatric


Psychiatric: appropriate mood/affect, intact judgment & insight





- Neurologic


Neurologic: CNII-XII intact, moves all extremities





Plan


Activity: no restrictions


Weight Bearing Status: Full Weight Bearing


Diet: diabetic


Follow up with: 


PRIMARY CARE,MD [Primary Care Provider] - 3-5 Days


ALLY MARQUEZ MD [Staff Physician] - 7 Days


SHERICE LAWSON MD [Staff Physician] - 7 Days


KIRK FOX MD [Staff Physician] - 7 Days


Prescriptions: 


Acyclovir [Zovirax Tab] 800 mg PO TID #21 tablet


Clindamycin [Clindamycin CAP] 450 mg PO Q8HR #42 capsule


HYDROcodone/APAP 5-325 [Norco 5-325 mg TAB] 1 each PO Q4H PRN #20 tablet


 PRN Reason: Pain, Moderate (4-6)


Insulin Detemir [Levemir] 10 units SUB-Q QHS #30 units


Insulin NPH/Regular [NovoLIN 70/30] 25 unit SUB-Q QAMDIAB #30 units


Insulin NPH/Regular [NovoLIN 70/30] 15 unit SUB-Q QPM #30 units


Midodrine [Proamatine] 10 mg PO Q8H #30 tablet


PARoxetine CR (NF) [Paxil (Nf)] 12.5 mg PO QDAY #30 tablet


Sulfamethoxazole/Trimethoprim [Bactrim DS TAB] 1 each PO BID #28 tablet

## 2018-02-13 ENCOUNTER — HOSPITAL ENCOUNTER (EMERGENCY)
Dept: HOSPITAL 5 - ED | Age: 58
Discharge: LEFT BEFORE DISCHARGE | End: 2018-02-13
Payer: SELF-PAY

## 2018-02-13 VITALS — DIASTOLIC BLOOD PRESSURE: 84 MMHG | SYSTOLIC BLOOD PRESSURE: 177 MMHG

## 2018-02-13 DIAGNOSIS — Z53.21: Primary | ICD-10-CM

## 2018-02-13 LAB
BASOPHILS # (AUTO): 0.1 K/MM3 (ref 0–0.1)
BASOPHILS NFR BLD AUTO: 0.4 % (ref 0–1.8)
BUN SERPL-MCNC: 16 MG/DL (ref 7–17)
BUN/CREAT SERPL: 20 %
CALCIUM SERPL-MCNC: 9.1 MG/DL (ref 8.4–10.2)
EOSINOPHIL # BLD AUTO: 0.2 K/MM3 (ref 0–0.4)
EOSINOPHIL NFR BLD AUTO: 1.1 % (ref 0–4.3)
HCT VFR BLD CALC: 40.3 % (ref 30.3–42.9)
HEMOLYSIS INDEX: 0
HGB BLD-MCNC: 13.7 GM/DL (ref 10.1–14.3)
LYMPHOCYTES # BLD AUTO: 1.3 K/MM3 (ref 1.2–5.4)
LYMPHOCYTES NFR BLD AUTO: 9 % (ref 13.4–35)
MCH RBC QN AUTO: 29 PG (ref 28–32)
MCHC RBC AUTO-ENTMCNC: 34 % (ref 30–34)
MCV RBC AUTO: 85 FL (ref 79–97)
MONOCYTES # (AUTO): 0.7 K/MM3 (ref 0–0.8)
MONOCYTES % (AUTO): 4.7 % (ref 0–7.3)
PLATELET # BLD: 243 K/MM3 (ref 140–440)
RBC # BLD AUTO: 4.73 M/MM3 (ref 3.65–5.03)

## 2018-02-13 PROCEDURE — 82962 GLUCOSE BLOOD TEST: CPT

## 2018-02-13 PROCEDURE — 85025 COMPLETE CBC W/AUTO DIFF WBC: CPT

## 2018-02-13 PROCEDURE — 80048 BASIC METABOLIC PNL TOTAL CA: CPT

## 2018-02-13 PROCEDURE — 36415 COLL VENOUS BLD VENIPUNCTURE: CPT

## 2018-02-13 PROCEDURE — 82805 BLOOD GASES W/O2 SATURATION: CPT

## 2018-02-23 ENCOUNTER — HOSPITAL ENCOUNTER (EMERGENCY)
Dept: HOSPITAL 5 - ED | Age: 58
Discharge: HOME | End: 2018-02-23
Payer: SELF-PAY

## 2018-02-23 VITALS — SYSTOLIC BLOOD PRESSURE: 107 MMHG | DIASTOLIC BLOOD PRESSURE: 64 MMHG

## 2018-02-23 DIAGNOSIS — J45.909: ICD-10-CM

## 2018-02-23 DIAGNOSIS — M19.90: ICD-10-CM

## 2018-02-23 DIAGNOSIS — E11.9: ICD-10-CM

## 2018-02-23 DIAGNOSIS — I10: ICD-10-CM

## 2018-02-23 DIAGNOSIS — E05.90: ICD-10-CM

## 2018-02-23 DIAGNOSIS — L03.90: ICD-10-CM

## 2018-02-23 DIAGNOSIS — Z88.6: ICD-10-CM

## 2018-02-23 DIAGNOSIS — L02.811: Primary | ICD-10-CM

## 2018-02-23 DIAGNOSIS — F17.200: ICD-10-CM

## 2018-02-23 LAB
BUN SERPL-MCNC: 18 MG/DL (ref 7–17)
BUN/CREAT SERPL: 23 %
CALCIUM SERPL-MCNC: 8.5 MG/DL (ref 8.4–10.2)
HCT VFR BLD CALC: 37.3 % (ref 30.3–42.9)
HEMOLYSIS INDEX: 3
HGB BLD-MCNC: 12.6 GM/DL (ref 10.1–14.3)
MCH RBC QN AUTO: 29 PG (ref 28–32)
MCHC RBC AUTO-ENTMCNC: 34 % (ref 30–34)
MCV RBC AUTO: 85 FL (ref 79–97)
PLATELET # BLD: 212 K/MM3 (ref 140–440)
RBC # BLD AUTO: 4.38 M/MM3 (ref 3.65–5.03)

## 2018-02-23 PROCEDURE — 82962 GLUCOSE BLOOD TEST: CPT

## 2018-02-23 PROCEDURE — 36415 COLL VENOUS BLD VENIPUNCTURE: CPT

## 2018-02-23 PROCEDURE — 85027 COMPLETE CBC AUTOMATED: CPT

## 2018-02-23 PROCEDURE — 96375 TX/PRO/DX INJ NEW DRUG ADDON: CPT

## 2018-02-23 PROCEDURE — 99284 EMERGENCY DEPT VISIT MOD MDM: CPT

## 2018-02-23 PROCEDURE — 80048 BASIC METABOLIC PNL TOTAL CA: CPT

## 2018-02-23 PROCEDURE — 96365 THER/PROPH/DIAG IV INF INIT: CPT

## 2018-02-23 PROCEDURE — 10061 I&D ABSCESS COMP/MULTIPLE: CPT

## 2018-02-23 NOTE — EMERGENCY DEPARTMENT REPORT
ED General Adult HPI





- General


Stated complaint: WOUND INFECTION


Time Seen by Provider: 02/23/18 06:07


Source: patient, EMS





- History of Present Illness


Initial comments: 





Mrs. Lorenz is a 58-year-old female with a history of hypertension, thyroid 

disease and diabetes who presents with abscess.  She has a history of multiple 

abscesses.  She required admission to the hospital last year for right flank/

axillary abscess.





She's had a scalp at abscess of the right posterior hairline for the last 2 

weeks.  She came to the ER twice prior but due to prolonged wait she was unable 

to stay.  She has drainage from the area.  She denies fever.  She has pain at 

that site.  She denies vomiting.  She denies malaise.





Her last tetanus booster was received here in the hospital one year ago.  Pain 

is moderate.





- Related Data


 Home Medications











 Medication  Instructions  Recorded  Confirmed  Last Taken


 


Escitalopram [Lexapro] 10 mg PO DAILY 08/11/17 08/11/17 08/11/17


 


Levothyroxine [Synthroid] 50 mcg PO QAM 08/11/17 08/11/17 08/11/17


 


Lisinopril [Zestril TAB] 20 mg PO QDAY 08/11/17 08/11/17 08/11/17


 


PARoxetine CR (NF) [Paxil (Nf)] 12.5 mg PO QAM 08/11/17 08/11/17 08/11/17


 


Rosuvastatin (Nf) [Crestor] 20 mg PO QHS 08/11/17 08/11/17 08/11/17


 


Combivent Respimat 20 mcg INHALATION Q6H PRN 08/15/17 08/15/17 Unknown


 


Ventolin HFA 90 mcg INHALATION Q4H PRN 08/15/17 08/15/17 Unknown








 Previous Rx's











 Medication  Instructions  Recorded  Last Taken  Type


 


Acyclovir [Zovirax Tab] 800 mg PO TID #21 tablet 08/16/17 Unknown Rx


 


Clindamycin [Clindamycin CAP] 450 mg PO Q8HR #42 capsule 08/16/17 Unknown Rx


 


HYDROcodone/APAP 5-325 [Norco 1 each PO Q4H PRN #20 tablet 08/16/17 Unknown Rx





5-325 mg TAB]    


 


Insulin Detemir [Levemir] 10 units SUB-Q QHS #30 units 08/16/17 Unknown Rx


 


Insulin NPH/Regular [NovoLIN 70/30] 15 unit SUB-Q QPM #30 units 08/16/17 

Unknown Rx


 


Insulin NPH/Regular [NovoLIN 70/30] 25 unit SUB-Q QAMDIAB #30 units 08/16/17 

Unknown Rx


 


Midodrine [Proamatine] 10 mg PO Q8H #30 tablet 08/16/17 Unknown Rx


 


PARoxetine CR (NF) [Paxil (Nf)] 12.5 mg PO QDAY #30 tablet 08/16/17 Unknown Rx


 


Sulfamethoxazole/Trimethoprim 1 each PO BID #28 tablet 08/16/17 Unknown Rx





[Bactrim DS TAB]    


 


Cephalexin [Keflex] 500 mg PO Q6HR 10 Days #40 capsule 02/23/18 Unknown Rx


 


HYDROcodone/ACETAMINOPHEN [[place] 1 each PO Q6H #20 tablet 02/23/18 Unknown Rx





 Tablet]    


 


Sulfamethoxazole/Trimethoprim 1 each PO BID 10 Days #20 tablet 02/23/18 Unknown 

Rx





[Bactrim DS TAB]    











 Allergies











Allergy/AdvReac Type Severity Reaction Status Date / Time


 


codeine Allergy  Hives Verified 02/13/18 10:12














ED Review of Systems


ROS: 


Stated complaint: WOUND INFECTION


Other details as noted in HPI





Comment: All other systems reviewed and negative


Constitutional: denies: fever, malaise


Cardiovascular: denies: chest pain





ED Past Medical Hx





- Past Medical History


Hx Hypertension: Yes


Hx Diabetes: Yes


Hx Arthritis: Yes


Hx Asthma: Yes


Additional medical history: hyperthyroid





- Surgical History


Hx Appendectomy: Yes (age 30-35)


Additional Surgical History: fusion to back





- Social History


Smoking Status: Current Every Day Smoker





- Medications


Home Medications: 


 Home Medications











 Medication  Instructions  Recorded  Confirmed  Last Taken  Type


 


Escitalopram [Lexapro] 10 mg PO DAILY 08/11/17 08/11/17 08/11/17 History


 


Levothyroxine [Synthroid] 50 mcg PO QAM 08/11/17 08/11/17 08/11/17 History


 


Lisinopril [Zestril TAB] 20 mg PO QDAY 08/11/17 08/11/17 08/11/17 History


 


PARoxetine CR (NF) [Paxil (Nf)] 12.5 mg PO QAM 08/11/17 08/11/17 08/11/17 

History


 


Rosuvastatin (Nf) [Crestor] 20 mg PO QHS 08/11/17 08/11/17 08/11/17 History


 


Combivent Respimat 20 mcg INHALATION Q6H PRN 08/15/17 08/15/17 Unknown History


 


Ventolin HFA 90 mcg INHALATION Q4H PRN 08/15/17 08/15/17 Unknown History


 


Acyclovir [Zovirax Tab] 800 mg PO TID #21 tablet 08/16/17  Unknown Rx


 


Clindamycin [Clindamycin CAP] 450 mg PO Q8HR #42 capsule 08/16/17  Unknown Rx


 


HYDROcodone/APAP 5-325 [[place]co 1 each PO Q4H PRN #20 tablet 08/16/17  Unknown Rx





5-325 mg TAB]     


 


Insulin Detemir [Levemir] 10 units SUB-Q QHS #30 units 08/16/17  Unknown Rx


 


Insulin NPH/Regular [NovoLIN 70/30] 15 unit SUB-Q QPM #30 units 08/16/17  

Unknown Rx


 


Insulin NPH/Regular [NovoLIN 70/30] 25 unit SUB-Q QAMDIAB #30 units 08/16/17  

Unknown Rx


 


Midodrine [Proamatine] 10 mg PO Q8H #30 tablet 08/16/17  Unknown Rx


 


PARoxetine CR (NF) [Paxil (Nf)] 12.5 mg PO QDAY #30 tablet 08/16/17  Unknown Rx


 


Sulfamethoxazole/Trimethoprim 1 each PO BID #28 tablet 08/16/17  Unknown Rx





[Bactrim DS TAB]     


 


Cephalexin [Keflex] 500 mg PO Q6HR 10 Days #40 capsule 02/23/18  Unknown Rx


 


HYDROcodone/ACETAMINOPHEN [[place] 1 each PO Q6H #20 tablet 02/23/18  Unknown Rx





 Tablet]     


 


Sulfamethoxazole/Trimethoprim 1 each PO BID 10 Days #20 tablet 02/23/18  

Unknown Rx





[Bactrim DS TAB]     














ED Physical Exam





- General


General appearance: alert, in no apparent distress, other (patient appears well 

she is healthy-appearing talkative)





- Head


Head exam: Present: atraumatic, normocephalic





- Eye


Eye exam: Present: normal appearance





- ENT


ENT exam: Present: mucous membranes moist





- Neck


Neck exam: Present: normal inspection.  Absent: tenderness, meningismus





- Respiratory


Respiratory exam: Present: normal lung sounds bilaterally.  Absent: respiratory 

distress, wheezes, rales, rhonchi, stridor





- Cardiovascular


Cardiovascular Exam: Present: regular rate, normal rhythm, normal heart sounds.

  Absent: bradycardia, tachycardia, irregular rhythm, systolic murmur, 

diastolic murmur, rubs, gallop





- GI/Abdominal


GI/Abdominal exam: Present: soft, normal bowel sounds.  Absent: distended, 

tenderness, guarding, rebound, rigid





- Extremities Exam


Extremities exam: Present: normal inspection





- Back Exam


Back exam: Present: normal inspection





- Neurological Exam


Neurological exam: Present: alert, oriented X3





- Psychiatric


Psychiatric exam: Present: normal affect, normal mood





- Skin


Skin exam: Present: warm, other (there is a 4 cm scalp abscess with overlying 

scar active drainage right posterior hairline no surrounding erythema).  Absent

: rash





ED Course


 Vital Signs











  02/23/18 02/23/18 02/23/18





  06:11 06:15 06:30


 


Temperature   


 


Pulse Rate   90


 


Respiratory   26 H





Rate   


 


Blood Pressure  146/74 149/81


 


O2 Sat by Pulse 99 99 99





Oximetry   














  02/23/18 02/23/18 02/23/18





  06:44 06:45 07:00


 


Temperature  98 F 


 


Pulse Rate  90 90


 


Respiratory 20 22 22





Rate   


 


Blood Pressure  139/73 139/73


 


O2 Sat by Pulse  97 97





Oximetry   














  02/23/18 02/23/18 02/23/18





  07:15 07:30 07:45


 


Temperature   


 


Pulse Rate 87 88 88


 


Respiratory 24 25 H 24





Rate   


 


Blood Pressure 138/71 139/73 153/77


 


O2 Sat by Pulse 96 95 95





Oximetry   














  02/23/18 02/23/18 02/23/18





  08:00 08:15 08:30


 


Temperature   


 


Pulse Rate 85 89 85


 


Respiratory 19 16 21





Rate   


 


Blood Pressure 161/76 138/75 124/64


 


O2 Sat by Pulse 96 97 96





Oximetry   














  02/23/18





  08:34


 


Temperature 


 


Pulse Rate 


 


Respiratory 15





Rate 


 


Blood Pressure 


 


O2 Sat by Pulse 97





Oximetry 














- I & D


  ** Right Head


Type of Procedure: Complex


Blade Size: 11


I & D Procedure: betadine prep


Progress: 





Mrs. Lorenz gave verbal informed consent for incision and drainage of right 

scalp abscess.  I used 2 mL of 1% lidocaine without epinephrine for local 

anesthesia after Betadine preparation.  2 cm incision with #11 scalpel.  

Approximately 5 mL of purulent drainage.  I used 10 cm of quarter inch iodoform 

gauze. sterile gauze dressing with tape.





ED Medical Decision Making





- Lab Data


Result diagrams: 


 02/23/18 08:18





 02/23/18 08:18





- Medical Decision Making





Scalp abscess, presumed MRSA, tetanus status UTD 


Mrs. Lorenz received IV vancomycin in the ED.  I performed incision and drainage.





Hyperglycemia with mild ketosis.  NO acidosis.  Insulin given in Ed.  Patient 

states her blood glucose is always high.  She received insulin IV in ED





rx: bactrim keflex norco





She will return to ER in 2 days for packing change and skin check.


Critical care attestation.: 


If time is entered above; I have spent that time in minutes in the direct care 

of this critically ill patient, excluding procedure time.








ED Disposition


Clinical Impression: 


 Cellulitis, Abscess





Disposition: DC-01 TO HOME OR SELFCARE


Is pt being admited?: No


Does the pt Need Aspirin: No


Condition: Stable


Instructions:  Abscess (ED)


Additional Instructions: 


please return to ER in 2 days for skin check and packing change


Prescriptions: 


Cephalexin [Keflex] 500 mg PO Q6HR 10 Days #40 capsule


HYDROcodone/ACETAMINOPHEN [Norco  Tablet] 1 each PO Q6H #20 tablet


Sulfamethoxazole/Trimethoprim [Bactrim DS TAB] 1 each PO BID 10 Days #20 tablet


Time of Disposition: 10:42

## 2018-07-22 ENCOUNTER — HOSPITAL ENCOUNTER (EMERGENCY)
Dept: HOSPITAL 5 - ED | Age: 58
Discharge: HOME | End: 2018-07-22
Payer: SELF-PAY

## 2018-07-22 VITALS — SYSTOLIC BLOOD PRESSURE: 96 MMHG | DIASTOLIC BLOOD PRESSURE: 59 MMHG

## 2018-07-22 DIAGNOSIS — E05.90: ICD-10-CM

## 2018-07-22 DIAGNOSIS — F17.200: ICD-10-CM

## 2018-07-22 DIAGNOSIS — I10: ICD-10-CM
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
## 2018-07-22 DIAGNOSIS — Z79.4: ICD-10-CM

## 2018-07-22 DIAGNOSIS — Z88.5: ICD-10-CM

## 2018-07-22 DIAGNOSIS — M19.90: ICD-10-CM
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
## 2018-07-22 DIAGNOSIS — J45.909: ICD-10-CM
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
## 2018-07-22 DIAGNOSIS — S62.102A: Primary | ICD-10-CM

## 2018-07-22 DIAGNOSIS — E11.9: ICD-10-CM

## 2018-07-22 PROCEDURE — 99284 EMERGENCY DEPT VISIT MOD MDM: CPT

## 2018-07-22 NOTE — EMERGENCY DEPARTMENT REPORT
Upper Extremity





- HPI


Chief Complaint: Extremity Injury, Upper


Stated Complaint: (L) WRIST PAIN


Time Seen by Provider: 07/22/18 16:36


Upper Extremity: Left Wrist


Occurred When: Today


Mechanism: Fall


Severity: moderate


Symptoms: Yes Pain with Movement, Yes Deformity, Yes Limited Range of Movement, 

Yes Swelling, No Numbness, No Weakness, No Bruising/Ecchymosis, No Laceration 

or Abrasion


Other History: 58-year-old female past medical history final fusion surgery, 

hyperlipidemia, hypertension, diabetes, depression, hypothyroid, arthritis, 

asthma presents with complaint of left hand and wrist pain status post 

mechanical fall at home.  Patient states she accidentally tripped over flip 

flops while answering her door earlier today.  Fell on an outstretched hand.  

Complaining of deformity and left wrist.  States her lower back is also 

hurting.  She is awake alert and oriented 3.  Denies any head trauma.





ED Review of Systems


ROS: 


Stated complaint: (L) WRIST PAIN


Other details as noted in HPI





Constitutional: denies: chills, fever


Eyes: denies: eye pain, eye discharge, vision change


ENT: denies: ear pain, throat pain


Respiratory: denies: cough, shortness of breath, wheezing


Cardiovascular: denies: chest pain, palpitations


Endocrine: no symptoms reported


Gastrointestinal: denies: abdominal pain, nausea, diarrhea


Genitourinary: denies: urgency, dysuria, discharge


Musculoskeletal: as per HPI.  denies: back pain, joint swelling, arthralgia


Skin: denies: rash, lesions


Neurological: denies: headache, weakness, paresthesias


Psychiatric: denies: anxiety, depression


Hematological/Lymphatic: denies: easy bleeding, easy bruising





ED Past Medical Hx





- Past Medical History


Hx Hypertension: Yes


Hx Diabetes: Yes


Hx Arthritis: Yes


Hx Asthma: Yes


Additional medical history: hyperthyroid





- Surgical History


Hx Appendectomy: Yes


Additional Surgical History: fusion to back





- Social History


Smoking Status: Current Every Day Smoker


Substance Use Type: None





- Medications


Home Medications: 


 Home Medications











 Medication  Instructions  Recorded  Confirmed  Last Taken  Type


 


Escitalopram [Lexapro] 10 mg PO DAILY 08/11/17 08/11/17 08/11/17 History


 


Levothyroxine [Synthroid] 50 mcg PO QAM 08/11/17 08/11/17 08/11/17 History


 


Lisinopril [Zestril TAB] 20 mg PO QDAY 08/11/17 08/11/17 08/11/17 History


 


PARoxetine CR (NF) [Paxil (Nf)] 12.5 mg PO QAM 08/11/17 08/11/17 08/11/17 

History


 


Rosuvastatin (Nf) [Crestor] 20 mg PO QHS 08/11/17 08/11/17 08/11/17 History


 


Combivent Respimat 20 mcg INHALATION Q6H PRN 08/15/17 08/15/17 Unknown History


 


Ventolin HFA 90 mcg INHALATION Q4H PRN 08/15/17 08/15/17 Unknown History


 


Acyclovir [Zovirax Tab] 800 mg PO TID #21 tablet 08/16/17  Unknown Rx


 


Clindamycin [Clindamycin CAP] 450 mg PO Q8HR #42 capsule 08/16/17  Unknown Rx


 


Detemir (Nf) [Levemir (Nf)] 10 units SUB-Q QHS #30 units 08/16/17  Unknown Rx


 


HYDROcodone/APAP 5-325 [[place] 1 each PO Q4H PRN #20 tablet 08/16/17  Unknown Rx





5-325 mg TAB]     


 


Insulin NPH/Regular [NovoLIN 70/30] 15 unit SUB-Q QPM #30 units 08/16/17  

Unknown Rx


 


Insulin NPH/Regular [NovoLIN 70/30] 25 unit SUB-Q QAMDIAB #30 units 08/16/17  

Unknown Rx


 


Midodrine [Proamatine] 10 mg PO Q8H #30 tablet 08/16/17  Unknown Rx


 


PARoxetine CR (NF) [Paxil (Nf)] 12.5 mg PO QDAY #30 tablet 08/16/17  Unknown Rx


 


Sulfamethoxazole/Trimethoprim 1 each PO BID #28 tablet 08/16/17  Unknown Rx





[Bactrim DS TAB]     


 


Cephalexin [Keflex] 500 mg PO Q6HR 10 Days #40 capsule 02/23/18  Unknown Rx


 


HYDROcodone/ACETAMINOPHEN [[place] 1 each PO Q6H #20 tablet 02/23/18  Unknown Rx





 Tablet]     


 


Sulfamethoxazole/Trimethoprim 1 each PO BID 10 Days #20 tablet 02/23/18  

Unknown Rx





[Bactrim DS TAB]     


 


HYDROcodone/APAP 5-325 [[place] 1 each PO Q6HR PRN #14 tablet 07/22/18  Unknown Rx





5/325]     














Upper Extremity Exam





- Exam


General: 


Vital signs noted. No distress. Alert and acting appropriately.





Head and Torso: No HEENT Abnormality, No Neck Tenderness, No Chest/Lungs 

Abnormality, No Abdominal Tenderness, No Back Tenderness


Shoulder Exam: Yes Normal Range of Motion in Shoulder (range of motion left 

shoulder clinically intact), No Shoulder Tenderness, No Clavicle Tenderness, No 

Shoulder Deformity, No AC Joint Tenderness


Arm Exam: No Arm/Humerus Tenderness, No Arm Deformity


Elbow: Yes Normal Range of Motion in Elbow (elbow range of motion clinically 

intact), No Elbow Tenderness, No Elbow Deformity


Forearm: Yes Pain with Pronation (patient has pain with pronation and 

supination of the left forearm at the distal left wrist), Yes Pain with 

Supination, No Forearm Tenderness, No Forearm Deformity


Wrist: Yes Wrist Tenderness, Yes Normal ROM in Wrist (she was able to extend 

her left wrist but it is very painful), No Wrist Deformity, No Snuffbox 

Tenderness, No Pain with Axial Thumb Compression


Hand: Yes Normal ROM in Digit(s), No Hand Tenderness, No Hand Deformity, No 

Digit Tenderness, No Digit(s) Deformity, No Tendon Dysfunction


CMS Exam: Yes Normal Distal Pulses (distal radial brachial and ulnar pulses 

strong to palpation), Yes Normal Capillary Refill (capillary refill less than 

one second golfing), Yes Normal Distal Sensation (distal sensation intact all 

fingers to proprioception left upper extremity), No Broken Skin


Hand L/R Back: 


  __________________________














  __________________________





 1 - Slight dinner fork deformity here.








ED Course


 Vital Signs











  07/22/18





  14:43


 


Temperature 97.7 F


 


Pulse Rate 85


 


Respiratory 18





Rate 


 


Blood Pressure 96/59


 


O2 Sat by Pulse 94





Oximetry 














ED Medical Decision Making





- Medical Decision Making


A/P: Left distal wrist fracture


1-patient placed in volar OCL splint with sling


2-short course of Norco when necessary


3-follow-up with orthopedics.  I emphasized the importance of follow-up with 

orthopedics to the patient.  Patient stated that she would call for follow-up 

as instructed within the next few days


4-neurovascular exam left upper extremity clinically intact.  Good distal 

pulses and sensation capillary refill less than one second all fingers and no 

wrist drop left wrist. 


5- case d/w ED attending before dc


Critical care attestation.: 


If time is entered above; I have spent that time in minutes in the direct care 

of this critically ill patient, excluding procedure time.








ED Disposition


Clinical Impression: 


Left wrist fracture


Qualifiers:


 Encounter type: initial encounter Fracture type: closed Qualified Code(s): 

S62.102A - Fracture of unspecified carpal bone, left wrist, initial encounter 

for closed fracture





Disposition: DC-01 TO HOME OR SELFCARE


Is pt being admited?: No


Does the pt Need Aspirin: No


Condition: Stable


Instructions:  Wrist Fracture in Adults (ED), Splint Care (ED)


Prescriptions: 


HYDROcodone/APAP 5-325 [[place] 5/325] 1 each PO Q6HR PRN #14 tablet


 PRN Reason: Pain


Referrals: 


[facility] [Provider Group] - 3-5 Days


CARLOTTA AUGUSTE MD [Staff Physician] - 3-5 Days


Forms:  Work/School Release Form(ED)


Time of Disposition: 17:27

## 2018-07-22 NOTE — XRAY REPORT
FINAL REPORT



EXAM:  XR WRIST 3+V LT



HISTORY:  s/p fall ? left wrist fracture 



COMPARISON:  None available. 



FINDINGS:  

Three views left wrist obtained. There is a transverse fracture

of the distal radial metaphysis with dorsal displacement of the

distal fracture segment by approximately 5 millimeters and slight

dorsal angulation. There may be a small comminuted fracture

segment extending to the articular surface of the distal radius.

Gross normal alignment of the carpal bones relative to the distal

radial articular surface. No other fracture identified. Mild

degenerative changes the 1st carpometacarpal joint. 



IMPRESSION:  

Comminuted and slightly impacted fracture of the distal radial

metaphysis. Dorsal displacement and slight dorsal angulation of

the distal fracture segment.

## 2018-07-22 NOTE — XRAY REPORT
FINAL REPORT



EXAM:  XR HAND 3+V LT



HISTORY:  s/p fall ? wrist/hand fracture 



COMPARISON:  Left wrist from the same date. 



FINDINGS:  

Three views of left hand obtained. Partial visualization of

distal radial fracture. Remaining bony structures are intact.

Mild degenerative changes of the interphalangeal joints and 1st

carpometacarpal joint. 



IMPRESSION:  

Re-demonstration of distal radial fracture. No other acute

fracture of the left hand.

## 2018-09-05 ENCOUNTER — HOSPITAL ENCOUNTER (EMERGENCY)
Dept: HOSPITAL 5 - ED | Age: 58
Discharge: LEFT BEFORE DISCHARGE | End: 2018-09-05
Payer: SELF-PAY

## 2018-09-05 VITALS — SYSTOLIC BLOOD PRESSURE: 142 MMHG | DIASTOLIC BLOOD PRESSURE: 96 MMHG

## 2018-09-05 DIAGNOSIS — M25.532: Primary | ICD-10-CM

## 2018-09-05 DIAGNOSIS — Z53.21: ICD-10-CM
